# Patient Record
Sex: MALE | Race: OTHER | NOT HISPANIC OR LATINO | URBAN - METROPOLITAN AREA
[De-identification: names, ages, dates, MRNs, and addresses within clinical notes are randomized per-mention and may not be internally consistent; named-entity substitution may affect disease eponyms.]

---

## 2022-08-22 ENCOUNTER — INPATIENT (INPATIENT)
Facility: HOSPITAL | Age: 27
LOS: 5 days | Discharge: LEFT BEFORE DISCHARGE | End: 2022-08-28
Attending: HOSPITALIST | Admitting: HOSPITALIST

## 2022-08-22 VITALS
OXYGEN SATURATION: 96 % | SYSTOLIC BLOOD PRESSURE: 111 MMHG | HEART RATE: 60 BPM | RESPIRATION RATE: 18 BRPM | TEMPERATURE: 97 F | DIASTOLIC BLOOD PRESSURE: 76 MMHG

## 2022-08-22 DIAGNOSIS — E11.10 TYPE 2 DIABETES MELLITUS WITH KETOACIDOSIS WITHOUT COMA: ICD-10-CM

## 2022-08-22 LAB
ALBUMIN SERPL ELPH-MCNC: 5.1 G/DL — SIGNIFICANT CHANGE UP (ref 3.5–5.2)
ALBUMIN SERPL ELPH-MCNC: 5.4 G/DL — HIGH (ref 3.5–5.2)
ALP SERPL-CCNC: 64 U/L — SIGNIFICANT CHANGE UP (ref 30–115)
ALP SERPL-CCNC: 83 U/L — SIGNIFICANT CHANGE UP (ref 30–115)
ALT FLD-CCNC: 18 U/L — SIGNIFICANT CHANGE UP (ref 0–41)
ALT FLD-CCNC: 21 U/L — SIGNIFICANT CHANGE UP (ref 0–41)
ANION GAP SERPL CALC-SCNC: 13 MMOL/L — SIGNIFICANT CHANGE UP (ref 7–14)
ANION GAP SERPL CALC-SCNC: 16 MMOL/L — HIGH (ref 7–14)
ANION GAP SERPL CALC-SCNC: 20 MMOL/L — HIGH (ref 7–14)
AST SERPL-CCNC: 17 U/L — SIGNIFICANT CHANGE UP (ref 0–41)
AST SERPL-CCNC: 21 U/L — SIGNIFICANT CHANGE UP (ref 0–41)
B-OH-BUTYR SERPL-SCNC: 3 MMOL/L — HIGH
BASE EXCESS BLDV CALC-SCNC: -9.8 MMOL/L — LOW (ref -2–3)
BASOPHILS # BLD AUTO: 0.04 K/UL — SIGNIFICANT CHANGE UP (ref 0–0.2)
BASOPHILS NFR BLD AUTO: 0.3 % — SIGNIFICANT CHANGE UP (ref 0–1)
BILIRUB SERPL-MCNC: 0.8 MG/DL — SIGNIFICANT CHANGE UP (ref 0.2–1.2)
BILIRUB SERPL-MCNC: 1.6 MG/DL — HIGH (ref 0.2–1.2)
BUN SERPL-MCNC: 18 MG/DL — SIGNIFICANT CHANGE UP (ref 10–20)
BUN SERPL-MCNC: 19 MG/DL — SIGNIFICANT CHANGE UP (ref 10–20)
BUN SERPL-MCNC: 20 MG/DL — SIGNIFICANT CHANGE UP (ref 10–20)
CA-I SERPL-SCNC: 1.21 MMOL/L — SIGNIFICANT CHANGE UP (ref 1.15–1.33)
CALCIUM SERPL-MCNC: 10.5 MG/DL — HIGH (ref 8.5–10.1)
CALCIUM SERPL-MCNC: 9.7 MG/DL — SIGNIFICANT CHANGE UP (ref 8.5–10.1)
CALCIUM SERPL-MCNC: 9.8 MG/DL — SIGNIFICANT CHANGE UP (ref 8.5–10.1)
CHLORIDE SERPL-SCNC: 104 MMOL/L — SIGNIFICANT CHANGE UP (ref 98–110)
CHLORIDE SERPL-SCNC: 107 MMOL/L — SIGNIFICANT CHANGE UP (ref 98–110)
CHLORIDE SERPL-SCNC: 94 MMOL/L — LOW (ref 98–110)
CK MB CFR SERPL CALC: 1.9 NG/ML — SIGNIFICANT CHANGE UP (ref 0.6–6.3)
CK SERPL-CCNC: 83 U/L — SIGNIFICANT CHANGE UP (ref 0–225)
CO2 SERPL-SCNC: 18 MMOL/L — SIGNIFICANT CHANGE UP (ref 17–32)
CO2 SERPL-SCNC: 21 MMOL/L — SIGNIFICANT CHANGE UP (ref 17–32)
CO2 SERPL-SCNC: 21 MMOL/L — SIGNIFICANT CHANGE UP (ref 17–32)
CREAT SERPL-MCNC: 1 MG/DL — SIGNIFICANT CHANGE UP (ref 0.7–1.5)
CREAT SERPL-MCNC: 1.1 MG/DL — SIGNIFICANT CHANGE UP (ref 0.7–1.5)
CREAT SERPL-MCNC: 1.2 MG/DL — SIGNIFICANT CHANGE UP (ref 0.7–1.5)
EGFR: 106 ML/MIN/1.73M2 — SIGNIFICANT CHANGE UP
EGFR: 85 ML/MIN/1.73M2 — SIGNIFICANT CHANGE UP
EGFR: 94 ML/MIN/1.73M2 — SIGNIFICANT CHANGE UP
EOSINOPHIL # BLD AUTO: 0.06 K/UL — SIGNIFICANT CHANGE UP (ref 0–0.7)
EOSINOPHIL NFR BLD AUTO: 0.4 % — SIGNIFICANT CHANGE UP (ref 0–8)
GAS PNL BLDV: 129 MMOL/L — LOW (ref 136–145)
GAS PNL BLDV: SIGNIFICANT CHANGE UP
GLUCOSE BLDC GLUCOMTR-MCNC: 106 MG/DL — HIGH (ref 70–99)
GLUCOSE BLDC GLUCOMTR-MCNC: 149 MG/DL — HIGH (ref 70–99)
GLUCOSE BLDC GLUCOMTR-MCNC: 160 MG/DL — HIGH (ref 70–99)
GLUCOSE BLDC GLUCOMTR-MCNC: 164 MG/DL — HIGH (ref 70–99)
GLUCOSE BLDC GLUCOMTR-MCNC: 179 MG/DL — HIGH (ref 70–99)
GLUCOSE BLDC GLUCOMTR-MCNC: 215 MG/DL — HIGH (ref 70–99)
GLUCOSE BLDC GLUCOMTR-MCNC: 253 MG/DL — HIGH (ref 70–99)
GLUCOSE BLDC GLUCOMTR-MCNC: 290 MG/DL — HIGH (ref 70–99)
GLUCOSE BLDC GLUCOMTR-MCNC: 294 MG/DL — HIGH (ref 70–99)
GLUCOSE BLDC GLUCOMTR-MCNC: 422 MG/DL — HIGH (ref 70–99)
GLUCOSE SERPL-MCNC: 108 MG/DL — HIGH (ref 70–99)
GLUCOSE SERPL-MCNC: 125 MG/DL — HIGH (ref 70–99)
GLUCOSE SERPL-MCNC: 501 MG/DL — CRITICAL HIGH (ref 70–99)
HCO3 BLDV-SCNC: 20 MMOL/L — LOW (ref 22–29)
HCT VFR BLD CALC: 49.8 % — SIGNIFICANT CHANGE UP (ref 42–52)
HCT VFR BLDA CALC: 52 % — HIGH (ref 39–51)
HGB BLD CALC-MCNC: 17.2 G/DL — SIGNIFICANT CHANGE UP (ref 12.6–17.4)
HGB BLD-MCNC: 17 G/DL — SIGNIFICANT CHANGE UP (ref 14–18)
IMM GRANULOCYTES NFR BLD AUTO: 0.3 % — SIGNIFICANT CHANGE UP (ref 0.1–0.3)
LACTATE BLDV-MCNC: 4.9 MMOL/L — CRITICAL HIGH (ref 0.5–2)
LIDOCAIN IGE QN: 13 U/L — SIGNIFICANT CHANGE UP (ref 7–60)
LYMPHOCYTES # BLD AUTO: 1.27 K/UL — SIGNIFICANT CHANGE UP (ref 1.2–3.4)
LYMPHOCYTES # BLD AUTO: 8.3 % — LOW (ref 20.5–51.1)
MAGNESIUM SERPL-MCNC: 1.8 MG/DL — SIGNIFICANT CHANGE UP (ref 1.8–2.4)
MCHC RBC-ENTMCNC: 28.9 PG — SIGNIFICANT CHANGE UP (ref 27–31)
MCHC RBC-ENTMCNC: 34.1 G/DL — SIGNIFICANT CHANGE UP (ref 32–37)
MCV RBC AUTO: 84.7 FL — SIGNIFICANT CHANGE UP (ref 80–94)
MONOCYTES # BLD AUTO: 1.01 K/UL — HIGH (ref 0.1–0.6)
MONOCYTES NFR BLD AUTO: 6.6 % — SIGNIFICANT CHANGE UP (ref 1.7–9.3)
NEUTROPHILS # BLD AUTO: 12.81 K/UL — HIGH (ref 1.4–6.5)
NEUTROPHILS NFR BLD AUTO: 84.1 % — HIGH (ref 42.2–75.2)
NRBC # BLD: 0 /100 WBCS — SIGNIFICANT CHANGE UP (ref 0–0)
PCO2 BLDV: 57 MMHG — HIGH (ref 42–55)
PH BLDV: 7.15 — LOW (ref 7.32–7.43)
PLATELET # BLD AUTO: 246 K/UL — SIGNIFICANT CHANGE UP (ref 130–400)
PO2 BLDV: 30 MMHG — SIGNIFICANT CHANGE UP
POTASSIUM BLDV-SCNC: 4.7 MMOL/L — SIGNIFICANT CHANGE UP (ref 3.5–5.1)
POTASSIUM SERPL-MCNC: 3.9 MMOL/L — SIGNIFICANT CHANGE UP (ref 3.5–5)
POTASSIUM SERPL-MCNC: 4.1 MMOL/L — SIGNIFICANT CHANGE UP (ref 3.5–5)
POTASSIUM SERPL-MCNC: 5.3 MMOL/L — HIGH (ref 3.5–5)
POTASSIUM SERPL-SCNC: 3.9 MMOL/L — SIGNIFICANT CHANGE UP (ref 3.5–5)
POTASSIUM SERPL-SCNC: 4.1 MMOL/L — SIGNIFICANT CHANGE UP (ref 3.5–5)
POTASSIUM SERPL-SCNC: 5.3 MMOL/L — HIGH (ref 3.5–5)
PROT SERPL-MCNC: 8.1 G/DL — HIGH (ref 6–8)
PROT SERPL-MCNC: 8.5 G/DL — HIGH (ref 6–8)
RBC # BLD: 5.88 M/UL — SIGNIFICANT CHANGE UP (ref 4.7–6.1)
RBC # FLD: 11.9 % — SIGNIFICANT CHANGE UP (ref 11.5–14.5)
SAO2 % BLDV: 36.7 % — SIGNIFICANT CHANGE UP
SARS-COV-2 RNA SPEC QL NAA+PROBE: SIGNIFICANT CHANGE UP
SODIUM SERPL-SCNC: 132 MMOL/L — LOW (ref 135–146)
SODIUM SERPL-SCNC: 141 MMOL/L — SIGNIFICANT CHANGE UP (ref 135–146)
SODIUM SERPL-SCNC: 141 MMOL/L — SIGNIFICANT CHANGE UP (ref 135–146)
TROPONIN T SERPL-MCNC: <0.01 NG/ML — SIGNIFICANT CHANGE UP
TROPONIN T SERPL-MCNC: <0.01 NG/ML — SIGNIFICANT CHANGE UP
WBC # BLD: 15.24 K/UL — HIGH (ref 4.8–10.8)
WBC # FLD AUTO: 15.24 K/UL — HIGH (ref 4.8–10.8)

## 2022-08-22 PROCEDURE — 71045 X-RAY EXAM CHEST 1 VIEW: CPT | Mod: 26

## 2022-08-22 PROCEDURE — 93010 ELECTROCARDIOGRAM REPORT: CPT

## 2022-08-22 PROCEDURE — 99291 CRITICAL CARE FIRST HOUR: CPT

## 2022-08-22 PROCEDURE — 93010 ELECTROCARDIOGRAM REPORT: CPT | Mod: 77

## 2022-08-22 PROCEDURE — 76705 ECHO EXAM OF ABDOMEN: CPT | Mod: 26

## 2022-08-22 PROCEDURE — 99223 1ST HOSP IP/OBS HIGH 75: CPT

## 2022-08-22 RX ORDER — SODIUM CHLORIDE 9 MG/ML
1000 INJECTION, SOLUTION INTRAVENOUS
Refills: 0 | Status: DISCONTINUED | OUTPATIENT
Start: 2022-08-22 | End: 2022-08-22

## 2022-08-22 RX ORDER — DEXTROSE 50 % IN WATER 50 %
15 SYRINGE (ML) INTRAVENOUS ONCE
Refills: 0 | Status: DISCONTINUED | OUTPATIENT
Start: 2022-08-22 | End: 2022-08-28

## 2022-08-22 RX ORDER — INSULIN HUMAN 100 [IU]/ML
7 INJECTION, SOLUTION SUBCUTANEOUS
Qty: 100 | Refills: 0 | Status: DISCONTINUED | OUTPATIENT
Start: 2022-08-22 | End: 2022-08-22

## 2022-08-22 RX ORDER — ONDANSETRON 8 MG/1
4 TABLET, FILM COATED ORAL ONCE
Refills: 0 | Status: COMPLETED | OUTPATIENT
Start: 2022-08-22 | End: 2022-08-22

## 2022-08-22 RX ORDER — ENOXAPARIN SODIUM 100 MG/ML
40 INJECTION SUBCUTANEOUS EVERY 24 HOURS
Refills: 0 | Status: DISCONTINUED | OUTPATIENT
Start: 2022-08-22 | End: 2022-08-28

## 2022-08-22 RX ORDER — SODIUM CHLORIDE 9 MG/ML
2000 INJECTION INTRAMUSCULAR; INTRAVENOUS; SUBCUTANEOUS ONCE
Refills: 0 | Status: COMPLETED | OUTPATIENT
Start: 2022-08-22 | End: 2022-08-22

## 2022-08-22 RX ORDER — METOCLOPRAMIDE HCL 10 MG
10 TABLET ORAL
Refills: 0 | Status: DISCONTINUED | OUTPATIENT
Start: 2022-08-22 | End: 2022-08-24

## 2022-08-22 RX ORDER — DEXTROSE 50 % IN WATER 50 %
25 SYRINGE (ML) INTRAVENOUS ONCE
Refills: 0 | Status: DISCONTINUED | OUTPATIENT
Start: 2022-08-22 | End: 2022-08-28

## 2022-08-22 RX ORDER — PANTOPRAZOLE SODIUM 20 MG/1
40 TABLET, DELAYED RELEASE ORAL DAILY
Refills: 0 | Status: DISCONTINUED | OUTPATIENT
Start: 2022-08-22 | End: 2022-08-23

## 2022-08-22 RX ORDER — SODIUM CHLORIDE 9 MG/ML
1000 INJECTION, SOLUTION INTRAVENOUS
Refills: 0 | Status: DISCONTINUED | OUTPATIENT
Start: 2022-08-22 | End: 2022-08-23

## 2022-08-22 RX ORDER — INSULIN HUMAN 100 [IU]/ML
6 INJECTION, SOLUTION SUBCUTANEOUS
Qty: 100 | Refills: 0 | Status: DISCONTINUED | OUTPATIENT
Start: 2022-08-22 | End: 2022-08-23

## 2022-08-22 RX ORDER — INSULIN GLARGINE 100 [IU]/ML
30 INJECTION, SOLUTION SUBCUTANEOUS ONCE
Refills: 0 | Status: COMPLETED | OUTPATIENT
Start: 2022-08-22 | End: 2022-08-22

## 2022-08-22 RX ORDER — INSULIN HUMAN 100 [IU]/ML
8 INJECTION, SOLUTION SUBCUTANEOUS
Qty: 100 | Refills: 0 | Status: DISCONTINUED | OUTPATIENT
Start: 2022-08-22 | End: 2022-08-22

## 2022-08-22 RX ORDER — INSULIN GLARGINE 100 [IU]/ML
30 INJECTION, SOLUTION SUBCUTANEOUS AT BEDTIME
Refills: 0 | Status: DISCONTINUED | OUTPATIENT
Start: 2022-08-23 | End: 2022-08-23

## 2022-08-22 RX ORDER — DEXTROSE 50 % IN WATER 50 %
50 SYRINGE (ML) INTRAVENOUS ONCE
Refills: 0 | Status: COMPLETED | OUTPATIENT
Start: 2022-08-22 | End: 2022-08-22

## 2022-08-22 RX ADMIN — ENOXAPARIN SODIUM 40 MILLIGRAM(S): 100 INJECTION SUBCUTANEOUS at 18:56

## 2022-08-22 RX ADMIN — SODIUM CHLORIDE 150 MILLILITER(S): 9 INJECTION, SOLUTION INTRAVENOUS at 19:53

## 2022-08-22 RX ADMIN — INSULIN HUMAN 7 UNIT(S)/HR: 100 INJECTION, SOLUTION SUBCUTANEOUS at 12:58

## 2022-08-22 RX ADMIN — SODIUM CHLORIDE 150 MILLILITER(S): 9 INJECTION, SOLUTION INTRAVENOUS at 18:34

## 2022-08-22 RX ADMIN — SODIUM CHLORIDE 150 MILLILITER(S): 9 INJECTION, SOLUTION INTRAVENOUS at 23:08

## 2022-08-22 RX ADMIN — Medication 50 MILLILITER(S): at 22:20

## 2022-08-22 RX ADMIN — ONDANSETRON 4 MILLIGRAM(S): 8 TABLET, FILM COATED ORAL at 11:14

## 2022-08-22 RX ADMIN — Medication 10 MILLIGRAM(S): at 18:10

## 2022-08-22 RX ADMIN — INSULIN GLARGINE 30 UNIT(S): 100 INJECTION, SOLUTION SUBCUTANEOUS at 22:28

## 2022-08-22 RX ADMIN — SODIUM CHLORIDE 2000 MILLILITER(S): 9 INJECTION INTRAMUSCULAR; INTRAVENOUS; SUBCUTANEOUS at 11:14

## 2022-08-22 RX ADMIN — ONDANSETRON 4 MILLIGRAM(S): 8 TABLET, FILM COATED ORAL at 12:58

## 2022-08-22 RX ADMIN — Medication 10 MILLIGRAM(S): at 15:30

## 2022-08-22 NOTE — H&P ADULT - PROBLEM SELECTOR PLAN 1
admit icu  correct sodium 138  1/2 NS @ 200 cc/hr  K 5.3 - no supplement at this time - cont to monitor bmp  insulin drip started at 7 units/hr with min dec - will increase to 8units/hr and monitor  fs q1  monitor bmp  when fs <200 change to d51/2ns  endocrine eval   lipase negative  bili elevated - repeat and check RUQ sono  leukocytosis - check UA, bcx  EKG reviewed - KATIA cw repolarization - check repeat and monitor CE - first set negative  - denies chest pain, tele monitor

## 2022-08-22 NOTE — ED PROVIDER NOTE - ATTENDING APP SHARED VISIT CONTRIBUTION OF CARE
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  He is visiting [place] from [place].  He reports noncompliance to his insulin regiment over the last 2 days.  He complains of nausea and vomiting.  He denies fever.  Vital signs noted.  Appears ill.  Chest clear.  Heart regular rate no murmur.  Abdomen mild epigastric tenderness.  Diagnostic testing reviewed.  Patient is in DKA.  IV fluid and IV insulin ordered.  Patient will be admitted to the ICU.

## 2022-08-22 NOTE — ED PROVIDER NOTE - NS ED ROS FT
Constitutional: No fever, chills.  Eyes:  No visual changes  ENMT:  No neck pain  Cardiac:  No chest pain  Respiratory:  No cough, SOB  GI:  (+) nausea, vomiting. denies diarrhea, abdominal pain.  :  No dysuria, hematuria  MS:  No back pain.  Neuro:  No headache or lightheadedness  Skin:  No skin rash    Except as documented in the HPI,  all other systems are negative.

## 2022-08-22 NOTE — ED PROVIDER NOTE - OBJECTIVE STATEMENT
27 yo M pmhx IDDM presenting to the ED for evaluation of nausea, vomiting, epigastric discomfort since last night. Pt reports he is visiting from [place], admits to missing doses of insulin over the past few days. Reports intractable nausea and NBNB vomiting. Denies any modifying factors. Denies fever, chills, chest pain, sob, diarrhea. Denies hx of smoking or alcohol use.

## 2022-08-22 NOTE — ED PROVIDER NOTE - CLINICAL SUMMARY MEDICAL DECISION MAKING FREE TEXT BOX
Patient is in DKA with unstable vital signs.  Patient is stabilized somewhat with the ED care.  He is critically ill.  He will be admitted to the ICU.

## 2022-08-22 NOTE — H&P ADULT - HISTORY OF PRESENT ILLNESS
25 yo male IDDM visiting from the [facility] - poor historian at moment due to nausea and vomiting - did not feel like speaking but tells me that vomiting and nasusea for 2 days - found to be in DKA in ER and admitted to ICU. states compliance with insulin, no recent illness or undercooked food exposure , + chills - no fever, denies etoh, drugs, no diarrhea, cough, dysuria normally takes basal insulin 30 units and 13 units split between prandial meals - doesnt recall names of insulin at moment.

## 2022-08-22 NOTE — ED PROVIDER NOTE - CRITICAL CARE ATTENDING CONTRIBUTION TO CARE
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  He is visiting [place] from [place].  He reports noncompliance to his insulin regiment over the last 2 days.  He complains of nausea and vomiting.  He denies fever.  Vital signs noted.  Appears ill.  Chest clear.  Heart regular rate no murmur.  Abdomen mild epigastric tenderness.  Diagnostic testing reviewed.  Patient is in DKA.  IV fluid and IV insulin ordered.  Patient will be admitted to the ICU.    I have reviewed and agree with the PA notes.

## 2022-08-22 NOTE — PATIENT PROFILE ADULT - FALL HARM RISK - HARM RISK INTERVENTIONS

## 2022-08-22 NOTE — ED PROVIDER NOTE - NS ED ATTENDING STATEMENT MOD
This was a shared visit with the SONG. I reviewed and verified the documentation and independently performed the documented: I have personally provided the amount of critical care time documented below excluding time spent on separate procedures.

## 2022-08-23 LAB
A1C WITH ESTIMATED AVERAGE GLUCOSE RESULT: 11.3 % — HIGH (ref 4–5.6)
ALBUMIN SERPL ELPH-MCNC: 4.7 G/DL — SIGNIFICANT CHANGE UP (ref 3.5–5.2)
ALP SERPL-CCNC: 65 U/L — SIGNIFICANT CHANGE UP (ref 30–115)
ALT FLD-CCNC: 14 U/L — SIGNIFICANT CHANGE UP (ref 0–41)
ANION GAP SERPL CALC-SCNC: 11 MMOL/L — SIGNIFICANT CHANGE UP (ref 7–14)
ANION GAP SERPL CALC-SCNC: 13 MMOL/L — SIGNIFICANT CHANGE UP (ref 7–14)
ANION GAP SERPL CALC-SCNC: 9 MMOL/L — SIGNIFICANT CHANGE UP (ref 7–14)
AST SERPL-CCNC: 13 U/L — SIGNIFICANT CHANGE UP (ref 0–41)
BILIRUB SERPL-MCNC: 0.7 MG/DL — SIGNIFICANT CHANGE UP (ref 0.2–1.2)
BUN SERPL-MCNC: 14 MG/DL — SIGNIFICANT CHANGE UP (ref 10–20)
BUN SERPL-MCNC: 15 MG/DL — SIGNIFICANT CHANGE UP (ref 10–20)
BUN SERPL-MCNC: 15 MG/DL — SIGNIFICANT CHANGE UP (ref 10–20)
CALCIUM SERPL-MCNC: 9.6 MG/DL — SIGNIFICANT CHANGE UP (ref 8.5–10.1)
CALCIUM SERPL-MCNC: 9.7 MG/DL — SIGNIFICANT CHANGE UP (ref 8.5–10.1)
CALCIUM SERPL-MCNC: 9.8 MG/DL — SIGNIFICANT CHANGE UP (ref 8.5–10.1)
CHLORIDE SERPL-SCNC: 101 MMOL/L — SIGNIFICANT CHANGE UP (ref 98–110)
CHLORIDE SERPL-SCNC: 103 MMOL/L — SIGNIFICANT CHANGE UP (ref 98–110)
CHLORIDE SERPL-SCNC: 103 MMOL/L — SIGNIFICANT CHANGE UP (ref 98–110)
CO2 SERPL-SCNC: 22 MMOL/L — SIGNIFICANT CHANGE UP (ref 17–32)
CO2 SERPL-SCNC: 23 MMOL/L — SIGNIFICANT CHANGE UP (ref 17–32)
CO2 SERPL-SCNC: 25 MMOL/L — SIGNIFICANT CHANGE UP (ref 17–32)
CREAT SERPL-MCNC: 0.9 MG/DL — SIGNIFICANT CHANGE UP (ref 0.7–1.5)
CREAT SERPL-MCNC: 1 MG/DL — SIGNIFICANT CHANGE UP (ref 0.7–1.5)
CREAT SERPL-MCNC: 1 MG/DL — SIGNIFICANT CHANGE UP (ref 0.7–1.5)
EGFR: 106 ML/MIN/1.73M2 — SIGNIFICANT CHANGE UP
EGFR: 106 ML/MIN/1.73M2 — SIGNIFICANT CHANGE UP
EGFR: 120 ML/MIN/1.73M2 — SIGNIFICANT CHANGE UP
ESTIMATED AVERAGE GLUCOSE: 278 MG/DL — HIGH (ref 68–114)
GLUCOSE BLDC GLUCOMTR-MCNC: 143 MG/DL — HIGH (ref 70–99)
GLUCOSE BLDC GLUCOMTR-MCNC: 154 MG/DL — HIGH (ref 70–99)
GLUCOSE BLDC GLUCOMTR-MCNC: 189 MG/DL — HIGH (ref 70–99)
GLUCOSE BLDC GLUCOMTR-MCNC: 234 MG/DL — HIGH (ref 70–99)
GLUCOSE BLDC GLUCOMTR-MCNC: 248 MG/DL — HIGH (ref 70–99)
GLUCOSE BLDC GLUCOMTR-MCNC: 266 MG/DL — HIGH (ref 70–99)
GLUCOSE BLDC GLUCOMTR-MCNC: 267 MG/DL — HIGH (ref 70–99)
GLUCOSE BLDC GLUCOMTR-MCNC: 302 MG/DL — HIGH (ref 70–99)
GLUCOSE BLDC GLUCOMTR-MCNC: 306 MG/DL — HIGH (ref 70–99)
GLUCOSE BLDC GLUCOMTR-MCNC: 94 MG/DL — SIGNIFICANT CHANGE UP (ref 70–99)
GLUCOSE BLDC GLUCOMTR-MCNC: 97 MG/DL — SIGNIFICANT CHANGE UP (ref 70–99)
GLUCOSE SERPL-MCNC: 169 MG/DL — HIGH (ref 70–99)
GLUCOSE SERPL-MCNC: 204 MG/DL — HIGH (ref 70–99)
GLUCOSE SERPL-MCNC: 68 MG/DL — LOW (ref 70–99)
HCT VFR BLD CALC: 46.5 % — SIGNIFICANT CHANGE UP (ref 42–52)
HGB BLD-MCNC: 15.8 G/DL — SIGNIFICANT CHANGE UP (ref 14–18)
MAGNESIUM SERPL-MCNC: 1.9 MG/DL — SIGNIFICANT CHANGE UP (ref 1.8–2.4)
MCHC RBC-ENTMCNC: 28.1 PG — SIGNIFICANT CHANGE UP (ref 27–31)
MCHC RBC-ENTMCNC: 34 G/DL — SIGNIFICANT CHANGE UP (ref 32–37)
MCV RBC AUTO: 82.7 FL — SIGNIFICANT CHANGE UP (ref 80–94)
NRBC # BLD: 0 /100 WBCS — SIGNIFICANT CHANGE UP (ref 0–0)
PHOSPHATE SERPL-MCNC: 2.7 MG/DL — SIGNIFICANT CHANGE UP (ref 2.1–4.9)
PLATELET # BLD AUTO: 240 K/UL — SIGNIFICANT CHANGE UP (ref 130–400)
POTASSIUM SERPL-MCNC: 3.6 MMOL/L — SIGNIFICANT CHANGE UP (ref 3.5–5)
POTASSIUM SERPL-MCNC: 4 MMOL/L — SIGNIFICANT CHANGE UP (ref 3.5–5)
POTASSIUM SERPL-MCNC: 4.3 MMOL/L — SIGNIFICANT CHANGE UP (ref 3.5–5)
POTASSIUM SERPL-SCNC: 3.6 MMOL/L — SIGNIFICANT CHANGE UP (ref 3.5–5)
POTASSIUM SERPL-SCNC: 4 MMOL/L — SIGNIFICANT CHANGE UP (ref 3.5–5)
POTASSIUM SERPL-SCNC: 4.3 MMOL/L — SIGNIFICANT CHANGE UP (ref 3.5–5)
PROT SERPL-MCNC: 7.5 G/DL — SIGNIFICANT CHANGE UP (ref 6–8)
RBC # BLD: 5.62 M/UL — SIGNIFICANT CHANGE UP (ref 4.7–6.1)
RBC # FLD: 12 % — SIGNIFICANT CHANGE UP (ref 11.5–14.5)
SODIUM SERPL-SCNC: 135 MMOL/L — SIGNIFICANT CHANGE UP (ref 135–146)
SODIUM SERPL-SCNC: 137 MMOL/L — SIGNIFICANT CHANGE UP (ref 135–146)
SODIUM SERPL-SCNC: 138 MMOL/L — SIGNIFICANT CHANGE UP (ref 135–146)
WBC # BLD: 18.07 K/UL — HIGH (ref 4.8–10.8)
WBC # FLD AUTO: 18.07 K/UL — HIGH (ref 4.8–10.8)

## 2022-08-23 PROCEDURE — 99233 SBSQ HOSP IP/OBS HIGH 50: CPT

## 2022-08-23 PROCEDURE — 93010 ELECTROCARDIOGRAM REPORT: CPT

## 2022-08-23 PROCEDURE — 99223 1ST HOSP IP/OBS HIGH 75: CPT

## 2022-08-23 PROCEDURE — 99253 IP/OBS CNSLTJ NEW/EST LOW 45: CPT

## 2022-08-23 RX ORDER — GLUCAGON INJECTION, SOLUTION 0.5 MG/.1ML
1 INJECTION, SOLUTION SUBCUTANEOUS ONCE
Refills: 0 | Status: DISCONTINUED | OUTPATIENT
Start: 2022-08-23 | End: 2022-08-28

## 2022-08-23 RX ORDER — ONDANSETRON 8 MG/1
4 TABLET, FILM COATED ORAL ONCE
Refills: 0 | Status: COMPLETED | OUTPATIENT
Start: 2022-08-23 | End: 2022-08-23

## 2022-08-23 RX ORDER — INSULIN HUMAN 100 [IU]/ML
1 INJECTION, SOLUTION SUBCUTANEOUS
Qty: 100 | Refills: 0 | Status: DISCONTINUED | OUTPATIENT
Start: 2022-08-23 | End: 2022-08-23

## 2022-08-23 RX ORDER — INSULIN LISPRO 100/ML
VIAL (ML) SUBCUTANEOUS
Refills: 0 | Status: DISCONTINUED | OUTPATIENT
Start: 2022-08-23 | End: 2022-08-28

## 2022-08-23 RX ORDER — ONDANSETRON 8 MG/1
4 TABLET, FILM COATED ORAL EVERY 6 HOURS
Refills: 0 | Status: DISCONTINUED | OUTPATIENT
Start: 2022-08-23 | End: 2022-08-25

## 2022-08-23 RX ORDER — SODIUM CHLORIDE 9 MG/ML
1000 INJECTION, SOLUTION INTRAVENOUS
Refills: 0 | Status: DISCONTINUED | OUTPATIENT
Start: 2022-08-23 | End: 2022-08-28

## 2022-08-23 RX ORDER — SODIUM CHLORIDE 9 MG/ML
1000 INJECTION, SOLUTION INTRAVENOUS
Refills: 0 | Status: DISCONTINUED | OUTPATIENT
Start: 2022-08-23 | End: 2022-08-24

## 2022-08-23 RX ORDER — INSULIN LISPRO 100/ML
10 VIAL (ML) SUBCUTANEOUS
Refills: 0 | Status: DISCONTINUED | OUTPATIENT
Start: 2022-08-23 | End: 2022-08-27

## 2022-08-23 RX ORDER — INSULIN GLARGINE 100 [IU]/ML
30 INJECTION, SOLUTION SUBCUTANEOUS AT BEDTIME
Refills: 0 | Status: DISCONTINUED | OUTPATIENT
Start: 2022-08-23 | End: 2022-08-27

## 2022-08-23 RX ORDER — FAMOTIDINE 10 MG/ML
40 INJECTION INTRAVENOUS
Refills: 0 | Status: DISCONTINUED | OUTPATIENT
Start: 2022-08-23 | End: 2022-08-28

## 2022-08-23 RX ORDER — INSULIN LISPRO 100/ML
30 VIAL (ML) SUBCUTANEOUS
Refills: 0 | Status: DISCONTINUED | OUTPATIENT
Start: 2022-08-23 | End: 2022-08-23

## 2022-08-23 RX ORDER — INSULIN HUMAN 100 [IU]/ML
3 INJECTION, SOLUTION SUBCUTANEOUS
Qty: 100 | Refills: 0 | Status: DISCONTINUED | OUTPATIENT
Start: 2022-08-23 | End: 2022-08-23

## 2022-08-23 RX ORDER — INSULIN GLARGINE 100 [IU]/ML
15 INJECTION, SOLUTION SUBCUTANEOUS ONCE
Refills: 0 | Status: COMPLETED | OUTPATIENT
Start: 2022-08-23 | End: 2022-08-23

## 2022-08-23 RX ADMIN — INSULIN GLARGINE 15 UNIT(S): 100 INJECTION, SOLUTION SUBCUTANEOUS at 21:45

## 2022-08-23 RX ADMIN — INSULIN HUMAN 1 UNIT(S)/HR: 100 INJECTION, SOLUTION SUBCUTANEOUS at 06:50

## 2022-08-23 RX ADMIN — Medication 10 UNIT(S): at 12:01

## 2022-08-23 RX ADMIN — Medication 10 MILLIGRAM(S): at 21:07

## 2022-08-23 RX ADMIN — ENOXAPARIN SODIUM 40 MILLIGRAM(S): 100 INJECTION SUBCUTANEOUS at 17:30

## 2022-08-23 RX ADMIN — Medication 10 MILLIGRAM(S): at 08:07

## 2022-08-23 RX ADMIN — INSULIN HUMAN 3 UNIT(S)/HR: 100 INJECTION, SOLUTION SUBCUTANEOUS at 00:30

## 2022-08-23 RX ADMIN — SODIUM CHLORIDE 75 MILLILITER(S): 9 INJECTION, SOLUTION INTRAVENOUS at 21:50

## 2022-08-23 RX ADMIN — ONDANSETRON 4 MILLIGRAM(S): 8 TABLET, FILM COATED ORAL at 09:29

## 2022-08-23 RX ADMIN — FAMOTIDINE 40 MILLIGRAM(S): 10 INJECTION INTRAVENOUS at 19:57

## 2022-08-23 RX ADMIN — Medication 8: at 12:01

## 2022-08-23 RX ADMIN — FAMOTIDINE 40 MILLIGRAM(S): 10 INJECTION INTRAVENOUS at 12:20

## 2022-08-23 RX ADMIN — Medication 10 UNIT(S): at 17:20

## 2022-08-23 NOTE — CHART NOTE - NSCHARTNOTEFT_GEN_A_CORE
MICU Transfer Note    Transfer from: MICU  Transfer to:  [facility] Floors      27 yo male IDDM visiting from the [facility] - poor historian at moment due to nausea and vomiting - did not feel like speaking but tells me that vomiting and nasusea for 2 days - found to be in DKA in ER and admitted to ICU. No recent illness or undercooked food exposure , + chills - no fever, denies etoh, drugs, no diarrhea, cough, dysuria normally takes basal insulin 30 units and 13 units split between prandial meals. On re-questioning, patient admitted to not taking his insulin.     s/p Insulin gtt & anion gap closure; Now switched to SQ Insulin; Patient asymptomatic and now stable for downgrade to floors;   Pending Endocrine recommendations.     Vital Signs Last 24 Hrs  T(C): 36.9 (23 Aug 2022 07:00), Max: 37.4 (22 Aug 2022 23:05)  T(F): 98.5 (23 Aug 2022 07:00), Max: 99.4 (22 Aug 2022 23:05)  HR: 68 (23 Aug 2022 09:00) (58 - 72)  BP: 131/71 (23 Aug 2022 09:00) (104/66 - 140/70)  BP(mean): 94 (23 Aug 2022 09:00) (81 - 106)  RR: 18 (23 Aug 2022 09:00) (8 - 31)  SpO2: 100% (23 Aug 2022 09:00) (97% - 100%)    Parameters below as of 23 Aug 2022 09:00  Patient On (Oxygen Delivery Method): room air      I&O's Summary    22 Aug 2022 07:01  -  23 Aug 2022 07:00  --------------------------------------------------------  IN: 2324 mL / OUT: 800 mL / NET: 1524 mL          MEDICATIONS  (STANDING):  dextrose 5%. 1000 milliLiter(s) (50 mL/Hr) IV Continuous <Continuous>  dextrose 5%. 1000 milliLiter(s) (100 mL/Hr) IV Continuous <Continuous>  dextrose 50% Injectable 25 Gram(s) IV Push once  dextrose Oral Gel 15 Gram(s) Oral once  enoxaparin Injectable 40 milliGRAM(s) SubCutaneous every 24 hours  glucagon  Injectable 1 milliGRAM(s) IntraMuscular once  insulin glargine Injectable (LANTUS) 30 Unit(s) SubCutaneous at bedtime  insulin lispro (ADMELOG) corrective regimen sliding scale   SubCutaneous three times a day before meals  insulin lispro Injectable (ADMELOG) 30 Unit(s) SubCutaneous three times a day before meals  pantoprazole  Injectable 40 milliGRAM(s) IV Push daily    MEDICATIONS  (PRN):  metoclopramide Injectable 10 milliGRAM(s) IV Push four times a day PRN vomiting nausea  ondansetron    Tablet 4 milliGRAM(s) Oral every 6 hours PRN Nausea and/or Vomiting        LABS                                            15.8                  Neurophils% (auto):   x      (08-23 @ 05:38):    18.07)-----------(240          Lymphocytes% (auto):  x                                             46.5                   Eosinphils% (auto):   x        Manual%: Neutrophils x    ; Lymphocytes x    ; Eosinophils x    ; Bands%: x    ; Blasts x                                    137    |  103    |  14                  Calcium: 9.8   / iCa: x      (08-23 @ 05:38)    ----------------------------<  169       Magnesium: 1.9                              4.3     |  23     |  0.9              Phosphorous: 2.9      TPro  7.5    /  Alb  4.7    /  TBili  0.7    /  DBili  x      /  AST  13     /  ALT  14     /  AlkPhos  65     23 Aug 2022 05:38

## 2022-08-23 NOTE — PROVIDER CONTACT NOTE (MEDICATION) - ASSESSMENT
Pt nauseous- spitting up small amounts of clear-yellow fluid   Recently medicated w/ pepcid, & relgan prn   FS 97

## 2022-08-23 NOTE — CONSULT NOTE ADULT - SUBJECTIVE AND OBJECTIVE BOX
HPI:  25 yo male IDDM visiting from the [facility] - poor historian at moment due to nausea and vomiting - did not feel like speaking but tells me that vomiting and nasusea for 2 days - found to be in DKA in ER and admitted to ICU. states compliance with insulin, no recent illness or undercooked food exposure , + chills - no fever, denies etoh, drugs, no diarrhea, cough, dysuria normally takes basal insulin 30 units and 13 units split between prandial meals - doesnt recall names of insulin at moment.  (22 Aug 2022 14:15)          PAST MEDICAL & SURGICAL HISTORY  Insulin dependent type 1 diabetes mellitus    No significant past surgical history        FAMILY HISTORY:  FAMILY HISTORY:  No pertinent family history in first degree relatives        SOCIAL HISTORY:  []smoker  []Alcohol  []Drug    ALLERGIES:  No Known Allergies      MEDICATIONS:  MEDICATIONS  (STANDING):  dextrose 5%. 1000 milliLiter(s) (50 mL/Hr) IV Continuous <Continuous>  dextrose 5%. 1000 milliLiter(s) (100 mL/Hr) IV Continuous <Continuous>  dextrose 50% Injectable 25 Gram(s) IV Push once  dextrose Oral Gel 15 Gram(s) Oral once  enoxaparin Injectable 40 milliGRAM(s) SubCutaneous every 24 hours  glucagon  Injectable 1 milliGRAM(s) IntraMuscular once  insulin glargine Injectable (LANTUS) 30 Unit(s) SubCutaneous at bedtime  insulin lispro (ADMELOG) corrective regimen sliding scale   SubCutaneous three times a day before meals  insulin lispro Injectable (ADMELOG) 10 Unit(s) SubCutaneous three times a day before meals    MEDICATIONS  (PRN):  famotidine    Tablet 40 milliGRAM(s) Oral two times a day PRN Reflux  metoclopramide Injectable 10 milliGRAM(s) IV Push four times a day PRN vomiting nausea  ondansetron    Tablet 4 milliGRAM(s) Oral every 6 hours PRN Nausea and/or Vomiting      HOME MEDICATIONS:  Home Medications:      VITALS:   T(F): 98.4 (08-23 @ 11:01), Max: 99.4 (08-22 @ 23:05)  HR: 65 (08-23 @ 13:20) (58 - 72)  BP: 127/84 (08-23 @ 13:20) (104/66 - 140/70)  BP(mean): 101 (08-23 @ 13:20) (81 - 106)  RR: 12 (08-23 @ 13:20) (8 - 31)  SpO2: 100% (08-23 @ 13:20) (96% - 100%)    I&O's Summary    22 Aug 2022 07:01  -  23 Aug 2022 07:00  --------------------------------------------------------  IN: 2324 mL / OUT: 800 mL / NET: 1524 mL        REVIEW OF SYSTEMS:  CONSTITUTIONAL: No weakness, fevers or chills  EYES: No visual changes  ENT: No vertigo or throat pain   NECK: No pain or stiffness  RESPIRATORY: No cough, wheezing, hemoptysis; No shortness of breath  CARDIOVASCULAR: No chest pain or palpitations  GASTROINTESTINAL: No abdominal or epigastric pain.+ nausea, vomiting, or hematemesis; No diarrhea or constipation.  GENITOURINARY: No Polyuria  NEUROLOGICAL:  No tremors, no Weakness or numbness      PHYSICAL EXAM:  GENERAL: Patient is awake , alert and oriented,  not in acute distress  EYES: No proptosis, no lid lag  NECK: No thyroid enlargement, no palpable nodules , no bruit  LUNGS: Clear to auscultation bilaterally   CARDIOVASCULAR: S1/S2 present, RRR , no murmurs  ABD: Soft, non-tender, non-distended, +BS  EXT: No VANESSA      LABS:                        15.8   18.07 )-----------( 240      ( 23 Aug 2022 05:38 )             46.5     08-23    137  |  103  |  14  ----------------------------<  169<H>  4.3   |  23  |  0.9    Ca    9.8      23 Aug 2022 05:38  Phos  2.9     08-23  Mg     1.9     08-23    TPro  7.5  /  Alb  4.7  /  TBili  0.7  /  DBili  x   /  AST  13  /  ALT  14  /  AlkPhos  65  08-23      Creatine Kinase, Serum: 83 U/L (08-22-22 @ 19:18)  Troponin T, Serum: <0.01 ng/mL (08-22-22 @ 19:18)    CARDIAC MARKERS ( 22 Aug 2022 19:18 )  x     / <0.01 ng/mL / 83 U/L / x     / 1.9 ng/mL  CARDIAC MARKERS ( 22 Aug 2022 12:30 )  x     / <0.01 ng/mL / x     / x     / x            POCT Blood Glucose.: 302 mg/dL (08-23-22 @ 11:50)  POCT Blood Glucose.: 143 mg/dL (08-23-22 @ 06:14)  POCT Blood Glucose.: 189 mg/dL (08-23-22 @ 05:16)  POCT Blood Glucose.: 234 mg/dL (08-23-22 @ 04:14)  POCT Blood Glucose.: 248 mg/dL (08-23-22 @ 02:20)  POCT Blood Glucose.: 266 mg/dL (08-23-22 @ 00:58)  POCT Blood Glucose.: 306 mg/dL (08-23-22 @ 00:17)  POCT Blood Glucose.: 215 mg/dL (08-22-22 @ 23:09)  POCT Blood Glucose.: 106 mg/dL (08-22-22 @ 22:06)  POCT Blood Glucose.: 149 mg/dL (08-22-22 @ 21:12)  POCT Blood Glucose.: 253 mg/dL (08-22-22 @ 20:08)  POCT Blood Glucose.: 160 mg/dL (08-22-22 @ 19:01)  POCT Blood Glucose.: 164 mg/dL (08-22-22 @ 18:05)  POCT Blood Glucose.: 179 mg/dL (08-22-22 @ 16:13)  POCT Blood Glucose.: 290 mg/dL (08-22-22 @ 15:31)  POCT Blood Glucose.: 294 mg/dL (08-22-22 @ 14:45)  POCT Blood Glucose.: 422 mg/dL (08-22-22 @ 13:46)  POCT Blood Glucose.: 463 mg/dL (08-22-22 @ 12:46)  POCT Blood Glucose.: 460 mg/dL (08-22-22 @ 11:43)  POCT Blood Glucose.: 476 mg/dL (08-22-22 @ 10:40)    A1C with Estimated Average Glucose in AM (08.23.22 @ 05:38)

## 2022-08-23 NOTE — CONSULT NOTE ADULT - SUBJECTIVE AND OBJECTIVE BOX
Patient is a 26y old  Male who presents with a chief complaint of vomiting (22 Aug 2022 14:15)      HPI:  27 yo male IDDM visiting from the [facility] - poor historian at moment due to nausea and vomiting - did not feel like speaking but tells me that vomiting and nasusea for 2 days - found to be in DKA in ER and admitted to ICU. states compliance with insulin, no recent illness or undercooked food exposure , + chills - no fever, denies etoh, drugs, no diarrhea, cough, dysuria normally takes basal insulin 30 units and 13 units split between prandial meals - doesnt recall names of insulin at moment.  (22 Aug 2022 14:15)  patient admitted to icu started on IV fluid and insulin drip     PAST MEDICAL & SURGICAL HISTORY:  Insulin dependent type 1 diabetes mellitus      No significant past surgical history        Allergies    No Known Allergies    Intolerances      Family history : no cardiovscular family history   Home Medications:    Occupation:  Alochol: Denied  Smoking: Denied  Drug Use: Denied  Marital Status:         ROS: as in HPI; All other systems reviewed are negative    ICU Vital Signs Last 24 Hrs  T(C): 36.9 (23 Aug 2022 07:00), Max: 37.4 (22 Aug 2022 23:05)  T(F): 98.5 (23 Aug 2022 07:00), Max: 99.4 (22 Aug 2022 23:05)  HR: 71 (23 Aug 2022 07:00) (58 - 72)  BP: 116/77 (23 Aug 2022 07:00) (104/66 - 140/70)  BP(mean): 92 (23 Aug 2022 07:00) (81 - 106)  ABP: --  ABP(mean): --  RR: 16 (23 Aug 2022 07:00) (8 - 31)  SpO2: 100% (23 Aug 2022 07:00) (96% - 100%)    O2 Parameters below as of 23 Aug 2022 07:00  Patient On (Oxygen Delivery Method): room air              Physical Examination:    General: No acute distress.  Alert, oriented, interactive, nonfocal    HEENT: Pupils equal, reactive to light.  Symmetric.    PULM: Clear to auscultation bilaterally, no significant sputum production    CVS: Regular rate and rhythm, no murmurs, rubs, or gallops    ABD: Soft, nondistended, nontender, normoactive bowel sounds, no masses    EXT: No edema, nontender, no clubbing     SKIN: Warm and well perfused, no rashes noted.    Neurology : no motor or sensory deficit     Musculoskeletal : move all extremity     Lymphatic system: no Palpable node               I&O's Detail    22 Aug 2022 07:01  -  23 Aug 2022 07:00  --------------------------------------------------------  IN:    dextrose 5% + sodium chloride 0.45%: 2250 mL    Insulin: 18 mL    Insulin: 1 mL    Insulin: 12 mL    Insulin: 43 mL  Total IN: 2324 mL    OUT:    Voided (mL): 800 mL  Total OUT: 800 mL    Total NET: 1524 mL            LABS:                        15.8   18.07 )-----------( 240      ( 23 Aug 2022 05:38 )             46.5     23 Aug 2022 05:38    137    |  103    |  14     ----------------------------<  169    4.3     |  23     |  0.9      Ca    9.8        23 Aug 2022 05:38  Phos  2.9       23 Aug 2022 05:38  Mg     1.9       23 Aug 2022 05:38    TPro  7.5    /  Alb  4.7    /  TBili  0.7    /  DBili  x      /  AST  13     /  ALT  14     /  AlkPhos  65     23 Aug 2022 05:38  Amylase x     lipase x          CARDIAC MARKERS ( 22 Aug 2022 19:18 )  x     / <0.01 ng/mL / 83 U/L / x     / 1.9 ng/mL  CARDIAC MARKERS ( 22 Aug 2022 12:30 )  x     / <0.01 ng/mL / x     / x     / x          CAPILLARY BLOOD GLUCOSE      POCT Blood Glucose.: 143 mg/dL (23 Aug 2022 06:14)        Culture        MEDICATIONS  (STANDING):  dextrose 5%. 1000 milliLiter(s) (50 mL/Hr) IV Continuous <Continuous>  dextrose 5%. 1000 milliLiter(s) (100 mL/Hr) IV Continuous <Continuous>  dextrose 50% Injectable 25 Gram(s) IV Push once  dextrose Oral Gel 15 Gram(s) Oral once  enoxaparin Injectable 40 milliGRAM(s) SubCutaneous every 24 hours  glucagon  Injectable 1 milliGRAM(s) IntraMuscular once  insulin glargine Injectable (LANTUS) 30 Unit(s) SubCutaneous at bedtime  insulin lispro (ADMELOG) corrective regimen sliding scale   SubCutaneous three times a day before meals  insulin lispro Injectable (ADMELOG) 30 Unit(s) SubCutaneous three times a day before meals  ondansetron Injectable 4 milliGRAM(s) IV Push once  pantoprazole  Injectable 40 milliGRAM(s) IV Push daily    MEDICATIONS  (PRN):  metoclopramide Injectable 10 milliGRAM(s) IV Push four times a day PRN vomiting nausea  ondansetron    Tablet 4 milliGRAM(s) Oral every 6 hours PRN Nausea and/or Vomiting        RADIOLOGY: ***     CXR: no infiltrate   TLC:  OG:  ET tube:        [facility] ICU:  ECHO:

## 2022-08-24 LAB
A1C WITH ESTIMATED AVERAGE GLUCOSE RESULT: 11 % — HIGH (ref 4–5.6)
ALBUMIN SERPL ELPH-MCNC: 4.3 G/DL — SIGNIFICANT CHANGE UP (ref 3.5–5.2)
ALP SERPL-CCNC: 72 U/L — SIGNIFICANT CHANGE UP (ref 30–115)
ALT FLD-CCNC: 12 U/L — SIGNIFICANT CHANGE UP (ref 0–41)
ANION GAP SERPL CALC-SCNC: 12 MMOL/L — SIGNIFICANT CHANGE UP (ref 7–14)
AST SERPL-CCNC: 13 U/L — SIGNIFICANT CHANGE UP (ref 0–41)
BASOPHILS # BLD AUTO: 0.02 K/UL — SIGNIFICANT CHANGE UP (ref 0–0.2)
BASOPHILS NFR BLD AUTO: 0.1 % — SIGNIFICANT CHANGE UP (ref 0–1)
BILIRUB SERPL-MCNC: 0.6 MG/DL — SIGNIFICANT CHANGE UP (ref 0.2–1.2)
BUN SERPL-MCNC: 14 MG/DL — SIGNIFICANT CHANGE UP (ref 10–20)
CALCIUM SERPL-MCNC: 9.2 MG/DL — SIGNIFICANT CHANGE UP (ref 8.5–10.1)
CHLORIDE SERPL-SCNC: 99 MMOL/L — SIGNIFICANT CHANGE UP (ref 98–110)
CO2 SERPL-SCNC: 23 MMOL/L — SIGNIFICANT CHANGE UP (ref 17–32)
CREAT SERPL-MCNC: 0.9 MG/DL — SIGNIFICANT CHANGE UP (ref 0.7–1.5)
EGFR: 120 ML/MIN/1.73M2 — SIGNIFICANT CHANGE UP
EOSINOPHIL # BLD AUTO: 0.01 K/UL — SIGNIFICANT CHANGE UP (ref 0–0.7)
EOSINOPHIL NFR BLD AUTO: 0.1 % — SIGNIFICANT CHANGE UP (ref 0–8)
ESTIMATED AVERAGE GLUCOSE: 269 MG/DL — HIGH (ref 68–114)
GLUCOSE BLDC GLUCOMTR-MCNC: 182 MG/DL — HIGH (ref 70–99)
GLUCOSE BLDC GLUCOMTR-MCNC: 192 MG/DL — HIGH (ref 70–99)
GLUCOSE BLDC GLUCOMTR-MCNC: 192 MG/DL — HIGH (ref 70–99)
GLUCOSE BLDC GLUCOMTR-MCNC: 209 MG/DL — HIGH (ref 70–99)
GLUCOSE BLDC GLUCOMTR-MCNC: 217 MG/DL — HIGH (ref 70–99)
GLUCOSE BLDC GLUCOMTR-MCNC: 224 MG/DL — HIGH (ref 70–99)
GLUCOSE SERPL-MCNC: 217 MG/DL — HIGH (ref 70–99)
HCT VFR BLD CALC: 43.4 % — SIGNIFICANT CHANGE UP (ref 42–52)
HGB BLD-MCNC: 14.8 G/DL — SIGNIFICANT CHANGE UP (ref 14–18)
IMM GRANULOCYTES NFR BLD AUTO: 0.8 % — HIGH (ref 0.1–0.3)
LYMPHOCYTES # BLD AUTO: 1.38 K/UL — SIGNIFICANT CHANGE UP (ref 1.2–3.4)
LYMPHOCYTES # BLD AUTO: 8.5 % — LOW (ref 20.5–51.1)
MAGNESIUM SERPL-MCNC: 1.7 MG/DL — LOW (ref 1.8–2.4)
MCHC RBC-ENTMCNC: 28.1 PG — SIGNIFICANT CHANGE UP (ref 27–31)
MCHC RBC-ENTMCNC: 34.1 G/DL — SIGNIFICANT CHANGE UP (ref 32–37)
MCV RBC AUTO: 82.4 FL — SIGNIFICANT CHANGE UP (ref 80–94)
MONOCYTES # BLD AUTO: 1.24 K/UL — HIGH (ref 0.1–0.6)
MONOCYTES NFR BLD AUTO: 7.6 % — SIGNIFICANT CHANGE UP (ref 1.7–9.3)
NEUTROPHILS # BLD AUTO: 13.54 K/UL — HIGH (ref 1.4–6.5)
NEUTROPHILS NFR BLD AUTO: 82.9 % — HIGH (ref 42.2–75.2)
NRBC # BLD: 0 /100 WBCS — SIGNIFICANT CHANGE UP (ref 0–0)
PLATELET # BLD AUTO: 205 K/UL — SIGNIFICANT CHANGE UP (ref 130–400)
POTASSIUM SERPL-MCNC: 3.8 MMOL/L — SIGNIFICANT CHANGE UP (ref 3.5–5)
POTASSIUM SERPL-SCNC: 3.8 MMOL/L — SIGNIFICANT CHANGE UP (ref 3.5–5)
PROT SERPL-MCNC: 6.8 G/DL — SIGNIFICANT CHANGE UP (ref 6–8)
RBC # BLD: 5.27 M/UL — SIGNIFICANT CHANGE UP (ref 4.7–6.1)
RBC # FLD: 12.3 % — SIGNIFICANT CHANGE UP (ref 11.5–14.5)
SODIUM SERPL-SCNC: 134 MMOL/L — LOW (ref 135–146)
WBC # BLD: 16.32 K/UL — HIGH (ref 4.8–10.8)
WBC # FLD AUTO: 16.32 K/UL — HIGH (ref 4.8–10.8)

## 2022-08-24 PROCEDURE — 99232 SBSQ HOSP IP/OBS MODERATE 35: CPT

## 2022-08-24 PROCEDURE — 99233 SBSQ HOSP IP/OBS HIGH 50: CPT

## 2022-08-24 RX ORDER — FAMOTIDINE 10 MG/ML
20 INJECTION INTRAVENOUS ONCE
Refills: 0 | Status: COMPLETED | OUTPATIENT
Start: 2022-08-24 | End: 2022-08-24

## 2022-08-24 RX ORDER — ROBINUL 0.2 MG/ML
0.2 INJECTION INTRAMUSCULAR; INTRAVENOUS DAILY
Refills: 0 | Status: DISCONTINUED | OUTPATIENT
Start: 2022-08-24 | End: 2022-08-24

## 2022-08-24 RX ORDER — MAGNESIUM SULFATE 500 MG/ML
2 VIAL (ML) INJECTION ONCE
Refills: 0 | Status: COMPLETED | OUTPATIENT
Start: 2022-08-24 | End: 2022-08-24

## 2022-08-24 RX ORDER — ROBINUL 0.2 MG/ML
0.2 INJECTION INTRAMUSCULAR; INTRAVENOUS ONCE
Refills: 0 | Status: COMPLETED | OUTPATIENT
Start: 2022-08-24 | End: 2022-08-24

## 2022-08-24 RX ORDER — ROBINUL 0.2 MG/ML
2 INJECTION INTRAMUSCULAR; INTRAVENOUS ONCE
Refills: 0 | Status: DISCONTINUED | OUTPATIENT
Start: 2022-08-24 | End: 2022-08-24

## 2022-08-24 RX ADMIN — Medication 2: at 11:16

## 2022-08-24 RX ADMIN — Medication 2: at 17:47

## 2022-08-24 RX ADMIN — ENOXAPARIN SODIUM 40 MILLIGRAM(S): 100 INJECTION SUBCUTANEOUS at 18:25

## 2022-08-24 RX ADMIN — Medication 4: at 08:21

## 2022-08-24 RX ADMIN — Medication 30 MILLILITER(S): at 18:32

## 2022-08-24 RX ADMIN — FAMOTIDINE 20 MILLIGRAM(S): 10 INJECTION INTRAVENOUS at 19:52

## 2022-08-24 RX ADMIN — Medication 25 GRAM(S): at 08:01

## 2022-08-24 RX ADMIN — ROBINUL 0.2 MILLIGRAM(S): 0.2 INJECTION INTRAMUSCULAR; INTRAVENOUS at 19:52

## 2022-08-24 RX ADMIN — Medication 10 UNIT(S): at 08:22

## 2022-08-24 NOTE — CHART NOTE - NSCHARTNOTEFT_GEN_A_CORE
25 yo male IDDM visiting from the [facility] - poor historian at moment due to nausea and vomiting - did not feel like speaking but tells me that vomiting and nasusea for 2 days - found to be in DKA in ER and admitted to ICU. No recent illness or undercooked food exposure , + chills - no fever, denies etoh, drugs, no diarrhea, cough, dysuria normally takes basal insulin 30 units and 13 units split between prandial meals. On re-questioning, patient admitted to not taking his insulin.     s/p Insulin gtt & anion gap closure; Now switched to SQ Insulin; Patient asymptomatic and now stable for downgrade to floors;   Seen by Endocrine;  Patient still excessively salivating & unable to tolerate PO; Repeat BMP showed AG still closed;   Stable for downgrade;

## 2022-08-24 NOTE — CHART NOTE - NSCHARTNOTEFT_GEN_A_CORE
Called to bedside by RN for patient complaining of increased secretions, and burning epigastric pain that radiates up to throat. Pepcid has helped him in the past. Patient denies CP SOB back pain. PE: S1S2 no murmur CTAB.     Plan: glycopyrrolate, pepcid, and maalox Called to bedside by RN for patient complaining of increased secretions, and burning epigastric pain that radiates up to throat. Pepcid has helped him in the past. Patient denies CP SOB back pain. PE: S1S2 no murmur CTAB.     Plan: glycopyrrolate, pepcid, and maalox    Plan discussed and agreed by attending ICU doctor Dr. Gomez.

## 2022-08-25 LAB
ALBUMIN SERPL ELPH-MCNC: 4 G/DL — SIGNIFICANT CHANGE UP (ref 3.5–5.2)
ALP SERPL-CCNC: 84 U/L — SIGNIFICANT CHANGE UP (ref 30–115)
ALT FLD-CCNC: 9 U/L — SIGNIFICANT CHANGE UP (ref 0–41)
ANION GAP SERPL CALC-SCNC: 14 MMOL/L — SIGNIFICANT CHANGE UP (ref 7–14)
AST SERPL-CCNC: 13 U/L — SIGNIFICANT CHANGE UP (ref 0–41)
BASOPHILS # BLD AUTO: 0.02 K/UL — SIGNIFICANT CHANGE UP (ref 0–0.2)
BASOPHILS NFR BLD AUTO: 0.2 % — SIGNIFICANT CHANGE UP (ref 0–1)
BILIRUB SERPL-MCNC: 0.8 MG/DL — SIGNIFICANT CHANGE UP (ref 0.2–1.2)
BUN SERPL-MCNC: 13 MG/DL — SIGNIFICANT CHANGE UP (ref 10–20)
CALCIUM SERPL-MCNC: 8.8 MG/DL — SIGNIFICANT CHANGE UP (ref 8.5–10.1)
CHLORIDE SERPL-SCNC: 92 MMOL/L — LOW (ref 98–110)
CO2 SERPL-SCNC: 22 MMOL/L — SIGNIFICANT CHANGE UP (ref 17–32)
CREAT SERPL-MCNC: 0.9 MG/DL — SIGNIFICANT CHANGE UP (ref 0.7–1.5)
EGFR: 120 ML/MIN/1.73M2 — SIGNIFICANT CHANGE UP
EOSINOPHIL # BLD AUTO: 0.01 K/UL — SIGNIFICANT CHANGE UP (ref 0–0.7)
EOSINOPHIL NFR BLD AUTO: 0.1 % — SIGNIFICANT CHANGE UP (ref 0–8)
GLUCOSE BLDC GLUCOMTR-MCNC: 173 MG/DL — HIGH (ref 70–99)
GLUCOSE BLDC GLUCOMTR-MCNC: 191 MG/DL — HIGH (ref 70–99)
GLUCOSE BLDC GLUCOMTR-MCNC: 229 MG/DL — HIGH (ref 70–99)
GLUCOSE SERPL-MCNC: 291 MG/DL — HIGH (ref 70–99)
HCT VFR BLD CALC: 42.9 % — SIGNIFICANT CHANGE UP (ref 42–52)
HGB BLD-MCNC: 14.8 G/DL — SIGNIFICANT CHANGE UP (ref 14–18)
IMM GRANULOCYTES NFR BLD AUTO: 0.4 % — HIGH (ref 0.1–0.3)
LYMPHOCYTES # BLD AUTO: 1.26 K/UL — SIGNIFICANT CHANGE UP (ref 1.2–3.4)
LYMPHOCYTES # BLD AUTO: 10.7 % — LOW (ref 20.5–51.1)
MAGNESIUM SERPL-MCNC: 1.8 MG/DL — SIGNIFICANT CHANGE UP (ref 1.8–2.4)
MCHC RBC-ENTMCNC: 28.2 PG — SIGNIFICANT CHANGE UP (ref 27–31)
MCHC RBC-ENTMCNC: 34.5 G/DL — SIGNIFICANT CHANGE UP (ref 32–37)
MCV RBC AUTO: 81.7 FL — SIGNIFICANT CHANGE UP (ref 80–94)
MONOCYTES # BLD AUTO: 0.79 K/UL — HIGH (ref 0.1–0.6)
MONOCYTES NFR BLD AUTO: 6.7 % — SIGNIFICANT CHANGE UP (ref 1.7–9.3)
NEUTROPHILS # BLD AUTO: 9.63 K/UL — HIGH (ref 1.4–6.5)
NEUTROPHILS NFR BLD AUTO: 81.9 % — HIGH (ref 42.2–75.2)
NRBC # BLD: 0 /100 WBCS — SIGNIFICANT CHANGE UP (ref 0–0)
PLATELET # BLD AUTO: 162 K/UL — SIGNIFICANT CHANGE UP (ref 130–400)
POTASSIUM SERPL-MCNC: 3.9 MMOL/L — SIGNIFICANT CHANGE UP (ref 3.5–5)
POTASSIUM SERPL-SCNC: 3.9 MMOL/L — SIGNIFICANT CHANGE UP (ref 3.5–5)
PROT SERPL-MCNC: 6.5 G/DL — SIGNIFICANT CHANGE UP (ref 6–8)
RBC # BLD: 5.25 M/UL — SIGNIFICANT CHANGE UP (ref 4.7–6.1)
RBC # FLD: 11.8 % — SIGNIFICANT CHANGE UP (ref 11.5–14.5)
SODIUM SERPL-SCNC: 128 MMOL/L — LOW (ref 135–146)
WBC # BLD: 11.76 K/UL — HIGH (ref 4.8–10.8)
WBC # FLD AUTO: 11.76 K/UL — HIGH (ref 4.8–10.8)

## 2022-08-25 PROCEDURE — 99232 SBSQ HOSP IP/OBS MODERATE 35: CPT

## 2022-08-25 RX ORDER — METOCLOPRAMIDE HCL 10 MG
5 TABLET ORAL EVERY 6 HOURS
Refills: 0 | Status: DISCONTINUED | OUTPATIENT
Start: 2022-08-25 | End: 2022-08-28

## 2022-08-25 RX ORDER — METOCLOPRAMIDE HCL 10 MG
5 TABLET ORAL ONCE
Refills: 0 | Status: COMPLETED | OUTPATIENT
Start: 2022-08-25 | End: 2022-08-25

## 2022-08-25 RX ORDER — ONDANSETRON 8 MG/1
4 TABLET, FILM COATED ORAL EVERY 8 HOURS
Refills: 0 | Status: DISCONTINUED | OUTPATIENT
Start: 2022-08-25 | End: 2022-08-28

## 2022-08-25 RX ORDER — INSULIN GLARGINE 100 [IU]/ML
30 INJECTION, SOLUTION SUBCUTANEOUS
Qty: 900 | Refills: 0
Start: 2022-08-25 | End: 2022-09-23

## 2022-08-25 RX ORDER — SODIUM CHLORIDE 9 MG/ML
1000 INJECTION INTRAMUSCULAR; INTRAVENOUS; SUBCUTANEOUS
Refills: 0 | Status: DISCONTINUED | OUTPATIENT
Start: 2022-08-25 | End: 2022-08-28

## 2022-08-25 RX ORDER — INSULIN LISPRO 100/ML
10 VIAL (ML) SUBCUTANEOUS
Qty: 900 | Refills: 0
Start: 2022-08-25 | End: 2022-09-23

## 2022-08-25 RX ADMIN — ONDANSETRON 4 MILLIGRAM(S): 8 TABLET, FILM COATED ORAL at 02:02

## 2022-08-25 RX ADMIN — Medication 10 UNIT(S): at 09:06

## 2022-08-25 RX ADMIN — FAMOTIDINE 40 MILLIGRAM(S): 10 INJECTION INTRAVENOUS at 22:10

## 2022-08-25 RX ADMIN — ENOXAPARIN SODIUM 40 MILLIGRAM(S): 100 INJECTION SUBCUTANEOUS at 17:26

## 2022-08-25 RX ADMIN — SODIUM CHLORIDE 100 MILLILITER(S): 9 INJECTION INTRAMUSCULAR; INTRAVENOUS; SUBCUTANEOUS at 16:08

## 2022-08-25 RX ADMIN — Medication 5 MILLIGRAM(S): at 16:08

## 2022-08-25 RX ADMIN — Medication 10 UNIT(S): at 17:26

## 2022-08-25 RX ADMIN — Medication 6: at 09:06

## 2022-08-25 RX ADMIN — INSULIN GLARGINE 30 UNIT(S): 100 INJECTION, SOLUTION SUBCUTANEOUS at 22:06

## 2022-08-25 RX ADMIN — ONDANSETRON 4 MILLIGRAM(S): 8 TABLET, FILM COATED ORAL at 11:05

## 2022-08-25 NOTE — DISCHARGE NOTE PROVIDER - HOSPITAL COURSE
# DKA - likely due to noncompliance with insulin - did not bring insulin on vacation  DKa resolved  switched to sc insulin - fs acceptable   normally takes Humalog 13 meal, 30 basaliglar qhs  endocrine recs appreciated  tolerating oral by d/c  will send Rx to [facility]  pt travelling from [facility] - doesn't live here.   RUQ sono negative   lipase negative  resolved, tolerating diet, stable for discharge # DKA - likely due to noncompliance with insulin - did not bring insulin on vacation  DKa resolved  switched to sc insulin - fs acceptable   normally takes Humalog 13 meal, 30 basaliglar qhs  endocrine recs appreciated  tolerating oral by d/c  will send Rx to [facility]  pt travelling from [facility] - doesn't live here.   RUQ sono negative   lipase negative  blood sugars running low, insulin decreased but patient is leaving hospital ama. Risk of hypogylcemia, DKA, and death explained.

## 2022-08-25 NOTE — DISCHARGE NOTE PROVIDER - NSDCCPCAREPLAN_GEN_ALL_CORE_FT
PRINCIPAL DISCHARGE DIAGNOSIS  Diagnosis: DKA, type 1  Assessment and Plan of Treatment: You were stabilzied and restarted on Lantus and Lispro.  Rx's were sent to the pharmacy.  make sure to take your insulin regularly and FU outpatient with your PMD.  Continue to check your FS.       PRINCIPAL DISCHARGE DIAGNOSIS  Diagnosis: DKA, type 1  Assessment and Plan of Treatment: You were stabilzied and restarted on Lantus and Lispro.  Rx's were sent to the pharmacy.  make sure to take your insulin regularly and FU outpatient with your PMD.  Continue to check your FS.      SECONDARY DISCHARGE DIAGNOSES  Diagnosis: Hypoglycemia  Assessment and Plan of Treatment: You insulin lantus dose was decreased. Lispro has been stopped for now. Please follow up with your primary care doctor as soon as possible. Monitor your blood sugar before meals and at bedtime. Take glucose tablets or juice if your blood sugars are running low. If your blood sugar continues to run low despite taking a glucose supplement or any change in mental status then return to hospital immediately.

## 2022-08-25 NOTE — CHART NOTE - NSCHARTNOTEFT_GEN_A_CORE
Called by medicine attending, patient stable for discharge.  c/w Lantus 30 and lispro 10 ac w/ss.  Rx's sent to [facility] as patient w/out of state Medicaid.  Papers completed, orders placed.

## 2022-08-25 NOTE — DISCHARGE NOTE PROVIDER - NSDCMRMEDTOKEN_GEN_ALL_CORE_FT
insulin glargine 100 units/mL subcutaneous solution: 30 unit(s) subcutaneous once a day (at bedtime)  insulin lispro 100 units/mL injectable solution: 10 unit(s) injectable 3 times a day AC w/ss coverage   insulin glargine 100 units/mL subcutaneous solution: 5 unit(s) subcutaneous once a day (at bedtime)

## 2022-08-26 LAB
ALBUMIN SERPL ELPH-MCNC: 4.1 G/DL — SIGNIFICANT CHANGE UP (ref 3.5–5.2)
ALP SERPL-CCNC: 60 U/L — SIGNIFICANT CHANGE UP (ref 30–115)
ALT FLD-CCNC: 9 U/L — SIGNIFICANT CHANGE UP (ref 0–41)
ANION GAP SERPL CALC-SCNC: 12 MMOL/L — SIGNIFICANT CHANGE UP (ref 7–14)
AST SERPL-CCNC: 11 U/L — SIGNIFICANT CHANGE UP (ref 0–41)
BILIRUB SERPL-MCNC: 1 MG/DL — SIGNIFICANT CHANGE UP (ref 0.2–1.2)
BUN SERPL-MCNC: 11 MG/DL — SIGNIFICANT CHANGE UP (ref 10–20)
CALCIUM SERPL-MCNC: 9.1 MG/DL — SIGNIFICANT CHANGE UP (ref 8.5–10.1)
CHLORIDE SERPL-SCNC: 95 MMOL/L — LOW (ref 98–110)
CO2 SERPL-SCNC: 25 MMOL/L — SIGNIFICANT CHANGE UP (ref 17–32)
CREAT SERPL-MCNC: 0.8 MG/DL — SIGNIFICANT CHANGE UP (ref 0.7–1.5)
EGFR: 124 ML/MIN/1.73M2 — SIGNIFICANT CHANGE UP
GLUCOSE BLDC GLUCOMTR-MCNC: 126 MG/DL — HIGH (ref 70–99)
GLUCOSE BLDC GLUCOMTR-MCNC: 141 MG/DL — HIGH (ref 70–99)
GLUCOSE BLDC GLUCOMTR-MCNC: 142 MG/DL — HIGH (ref 70–99)
GLUCOSE BLDC GLUCOMTR-MCNC: 224 MG/DL — HIGH (ref 70–99)
GLUCOSE BLDC GLUCOMTR-MCNC: 262 MG/DL — HIGH (ref 70–99)
GLUCOSE BLDC GLUCOMTR-MCNC: 44 MG/DL — CRITICAL LOW (ref 70–99)
GLUCOSE BLDC GLUCOMTR-MCNC: 73 MG/DL — SIGNIFICANT CHANGE UP (ref 70–99)
GLUCOSE BLDC GLUCOMTR-MCNC: 76 MG/DL — SIGNIFICANT CHANGE UP (ref 70–99)
GLUCOSE SERPL-MCNC: 226 MG/DL — HIGH (ref 70–99)
HCT VFR BLD CALC: 43.8 % — SIGNIFICANT CHANGE UP (ref 42–52)
HGB BLD-MCNC: 15.2 G/DL — SIGNIFICANT CHANGE UP (ref 14–18)
MCHC RBC-ENTMCNC: 28.4 PG — SIGNIFICANT CHANGE UP (ref 27–31)
MCHC RBC-ENTMCNC: 34.7 G/DL — SIGNIFICANT CHANGE UP (ref 32–37)
MCV RBC AUTO: 81.7 FL — SIGNIFICANT CHANGE UP (ref 80–94)
NRBC # BLD: 0 /100 WBCS — SIGNIFICANT CHANGE UP (ref 0–0)
PLATELET # BLD AUTO: 212 K/UL — SIGNIFICANT CHANGE UP (ref 130–400)
POTASSIUM SERPL-MCNC: 3.8 MMOL/L — SIGNIFICANT CHANGE UP (ref 3.5–5)
POTASSIUM SERPL-SCNC: 3.8 MMOL/L — SIGNIFICANT CHANGE UP (ref 3.5–5)
PROT SERPL-MCNC: 6.6 G/DL — SIGNIFICANT CHANGE UP (ref 6–8)
RBC # BLD: 5.36 M/UL — SIGNIFICANT CHANGE UP (ref 4.7–6.1)
RBC # FLD: 11.7 % — SIGNIFICANT CHANGE UP (ref 11.5–14.5)
SODIUM SERPL-SCNC: 132 MMOL/L — LOW (ref 135–146)
WBC # BLD: 8.23 K/UL — SIGNIFICANT CHANGE UP (ref 4.8–10.8)
WBC # FLD AUTO: 8.23 K/UL — SIGNIFICANT CHANGE UP (ref 4.8–10.8)

## 2022-08-26 PROCEDURE — 99232 SBSQ HOSP IP/OBS MODERATE 35: CPT

## 2022-08-26 RX ADMIN — Medication 5 MILLIGRAM(S): at 14:18

## 2022-08-26 RX ADMIN — Medication 4: at 07:38

## 2022-08-26 RX ADMIN — ONDANSETRON 4 MILLIGRAM(S): 8 TABLET, FILM COATED ORAL at 08:18

## 2022-08-26 RX ADMIN — ENOXAPARIN SODIUM 40 MILLIGRAM(S): 100 INJECTION SUBCUTANEOUS at 17:07

## 2022-08-26 RX ADMIN — Medication 10 UNIT(S): at 07:38

## 2022-08-26 RX ADMIN — SODIUM CHLORIDE 100 MILLILITER(S): 9 INJECTION INTRAMUSCULAR; INTRAVENOUS; SUBCUTANEOUS at 08:19

## 2022-08-26 RX ADMIN — INSULIN GLARGINE 30 UNIT(S): 100 INJECTION, SOLUTION SUBCUTANEOUS at 21:15

## 2022-08-26 RX ADMIN — Medication 10 UNIT(S): at 12:21

## 2022-08-27 LAB
GLUCOSE BLDC GLUCOMTR-MCNC: 112 MG/DL — HIGH (ref 70–99)
GLUCOSE BLDC GLUCOMTR-MCNC: 185 MG/DL — HIGH (ref 70–99)
GLUCOSE BLDC GLUCOMTR-MCNC: 40 MG/DL — CRITICAL LOW (ref 70–99)
GLUCOSE BLDC GLUCOMTR-MCNC: 71 MG/DL — SIGNIFICANT CHANGE UP (ref 70–99)
GLUCOSE BLDC GLUCOMTR-MCNC: 93 MG/DL — SIGNIFICANT CHANGE UP (ref 70–99)

## 2022-08-27 PROCEDURE — 99232 SBSQ HOSP IP/OBS MODERATE 35: CPT

## 2022-08-27 RX ORDER — INSULIN GLARGINE 100 [IU]/ML
15 INJECTION, SOLUTION SUBCUTANEOUS AT BEDTIME
Refills: 0 | Status: DISCONTINUED | OUTPATIENT
Start: 2022-08-27 | End: 2022-08-28

## 2022-08-27 RX ADMIN — SODIUM CHLORIDE 100 MILLILITER(S): 9 INJECTION INTRAMUSCULAR; INTRAVENOUS; SUBCUTANEOUS at 23:38

## 2022-08-27 RX ADMIN — Medication 2: at 16:43

## 2022-08-27 NOTE — PROGRESS NOTE ADULT - SUBJECTIVE AND OBJECTIVE BOX
NADER LUNA  26y  Male      Subjective:     continues to have ongoing nausea     REVIEW OF SYSTEMS:  All 12 ROS negative except ones mentioned in HPI     T(C): 37 (08-25-22 @ 13:47), Max: 37.4 (08-25-22 @ 05:11)  HR: 59 (08-25-22 @ 13:47) (56 - 63)  BP: 108/62 (08-25-22 @ 13:47) (108/62 - 143/71)  RR: 20 (08-25-22 @ 13:47) (18 - 20)  SpO2: 99% (08-24-22 @ 19:00) (98% - 99%)    PHYSICAL EXAM:  GENERAL: NAD  HEAD:  Atraumatic, Normocephalic  ENMT: Moist mucous membranes  NECK: Supple, No JVD  CHEST/LUNG: Clear to percussion bilaterally  HEART: Regular rate and rhythm; No murmurs, rubs, or gallops  ABDOMEN: Soft, Nontender, Nondistended; Bowel sounds present  EXTREMITIES:   No clubbing, cyanosis, or edema  NERVOUS SYSTEM:  Alert & Oriented X3      Consultant(s) Notes Reviewed:  [x ] YES  [ ] NO  Care Discussed with Consultants/Other Providers [ x] YES  [ ] NO    LAB:                        14.8   11.76 )-----------( 162      ( 25 Aug 2022 07:31 )             42.9     08-25    128<L>  |  92<L>  |  13  ----------------------------<  291<H>  3.9   |  22  |  0.9    Ca    8.8      25 Aug 2022 07:31  Phos  2.7     08-23  Mg     1.8     08-25    TPro  6.5  /  Alb  4.0  /  TBili  0.8  /  DBili  x   /  AST  13  /  ALT  9   /  AlkPhos  84  08-25    LIVER FUNCTIONS - ( 25 Aug 2022 07:31 )  Alb: 4.0 g/dL / Pro: 6.5 g/dL / ALK PHOS: 84 U/L / ALT: 9 U/L / AST: 13 U/L / GGT: x                     Drug Dosing Weight  Height (cm): 185.4 (24 Aug 2022 21:07)  Weight (kg): 72.5 (22 Aug 2022 15:35)  BMI (kg/m2): 21.1 (24 Aug 2022 21:07)  BSA (m2): 1.96 (24 Aug 2022 21:07)  Height (cm): 185.4 (08-24-22 @ 21:07)  CAPILLARY BLOOD GLUCOSE  262 (25 Aug 2022 08:15)      POCT Blood Glucose.: 229 mg/dL (25 Aug 2022 11:24)  POCT Blood Glucose.: 209 mg/dL (24 Aug 2022 22:47)  POCT Blood Glucose.: 224 mg/dL (24 Aug 2022 20:56)  POCT Blood Glucose.: 192 mg/dL (24 Aug 2022 17:40)  POCT Blood Glucose.: 182 mg/dL (24 Aug 2022 15:52)    I&O's Summary    24 Aug 2022 07:01  -  25 Aug 2022 07:00  --------------------------------------------------------  IN: 0 mL / OUT: 625 mL / NET: -625 mL          RADIOLOGY & ADDITIONAL TESTS:  Imaging Personally Reviewed:  [x] YES  [ ] NO    MEDS:  dextrose 5%. 1000 milliLiter(s) IV Continuous <Continuous>  dextrose 5%. 1000 milliLiter(s) IV Continuous <Continuous>  dextrose 50% Injectable 25 Gram(s) IV Push once  dextrose Oral Gel 15 Gram(s) Oral once  enoxaparin Injectable 40 milliGRAM(s) SubCutaneous every 24 hours  famotidine    Tablet 40 milliGRAM(s) Oral two times a day PRN  glucagon  Injectable 1 milliGRAM(s) IntraMuscular once  insulin glargine Injectable (LANTUS) 30 Unit(s) SubCutaneous at bedtime  insulin lispro (ADMELOG) corrective regimen sliding scale   SubCutaneous three times a day before meals  insulin lispro Injectable (ADMELOG) 10 Unit(s) SubCutaneous three times a day before meals  metoclopramide 5 milliGRAM(s) Oral every 6 hours PRN  metoclopramide 5 milliGRAM(s) Oral once  ondansetron Injectable 4 milliGRAM(s) IV Push every 8 hours PRN      
Patient is a 26y old  Male who presents with a chief complaint of vomiting (23 Aug 2022 14:15)      Over Night Events:  Patient seen and examined.   feel good     ROS:  See HPI    PHYSICAL EXAM    ICU Vital Signs Last 24 Hrs  T(C): 36.2 (24 Aug 2022 07:00), Max: 37.2 (24 Aug 2022 03:00)  T(F): 97.2 (24 Aug 2022 07:00), Max: 98.9 (24 Aug 2022 03:00)  HR: 55 (24 Aug 2022 07:00) (50 - 104)  BP: 119/78 (24 Aug 2022 05:16) (119/78 - 149/65)  BP(mean): 93 (24 Aug 2022 05:16) (93 - 101)  ABP: --  ABP(mean): --  RR: 16 (24 Aug 2022 07:00) (9 - 27)  SpO2: 100% (24 Aug 2022 05:16) (98% - 100%)    O2 Parameters below as of 24 Aug 2022 05:16  Patient On (Oxygen Delivery Method): room air            General:AOx3  HEENT:       heron         Lymph Nodes: NO cervical LN   Lungs: Bilateral BS  Cardiovascular: Regular   Abdomen: Soft, Positive BS  Extremities: No clubbing   Skin: warm   Neurological: no focal   Musculoskeletal: move all ext     I&O's Detail    23 Aug 2022 07:01  -  24 Aug 2022 07:00  --------------------------------------------------------  IN:    dextrose 5% + sodium chloride 0.9%: 750 mL    Oral Fluid: 260 mL  Total IN: 1010 mL    OUT:    Voided (mL): 750 mL  Total OUT: 750 mL    Total NET: 260 mL          LABS:                          14.8   16.32 )-----------( 205      ( 24 Aug 2022 05:27 )             43.4         24 Aug 2022 05:27    134    |  99     |  14     ----------------------------<  217    3.8     |  23     |  0.9      Ca    9.2        24 Aug 2022 05:27  Phos  2.7       23 Aug 2022 14:49  Mg     1.7       24 Aug 2022 05:27    TPro  6.8    /  Alb  4.3    /  TBili  0.6    /  DBili  x      /  AST  13     /  ALT  12     /  AlkPhos  72     24 Aug 2022 05:27  Amylase x     lipase x                                                                                              CARDIAC MARKERS ( 22 Aug 2022 19:18 )  x     / <0.01 ng/mL / 83 U/L / x     / 1.9 ng/mL  CARDIAC MARKERS ( 22 Aug 2022 12:30 )  x     / <0.01 ng/mL / x     / x     / x                                                                                                                                                 MEDICATIONS  (STANDING):  dextrose 5%. 1000 milliLiter(s) (50 mL/Hr) IV Continuous <Continuous>  dextrose 5%. 1000 milliLiter(s) (100 mL/Hr) IV Continuous <Continuous>  dextrose 50% Injectable 25 Gram(s) IV Push once  dextrose Oral Gel 15 Gram(s) Oral once  enoxaparin Injectable 40 milliGRAM(s) SubCutaneous every 24 hours  glucagon  Injectable 1 milliGRAM(s) IntraMuscular once  insulin glargine Injectable (LANTUS) 30 Unit(s) SubCutaneous at bedtime  insulin lispro (ADMELOG) corrective regimen sliding scale   SubCutaneous three times a day before meals  insulin lispro Injectable (ADMELOG) 10 Unit(s) SubCutaneous three times a day before meals    MEDICATIONS  (PRN):  famotidine    Tablet 40 milliGRAM(s) Oral two times a day PRN Reflux  metoclopramide Injectable 10 milliGRAM(s) IV Push four times a day PRN vomiting nausea  ondansetron    Tablet 4 milliGRAM(s) Oral every 6 hours PRN Nausea and/or Vomiting          Xrays:  TLC:  OG:  ET tube:                                                                                       ECHO:  [facility] ICU:        
  NADER LUNA  26y  Male      Subjective:     nausea and vomiting improving. Has multiple episodes of hypoglycema     REVIEW OF SYSTEMS:  All 12 ROS negative except ones mentioned in HPI     Vital Signs Last 24 Hrs  T(C): 37 (27 Aug 2022 04:32), Max: 37.2 (26 Aug 2022 20:40)  T(F): 98.6 (27 Aug 2022 04:32), Max: 98.9 (26 Aug 2022 20:40)  HR: 62 (27 Aug 2022 04:32) (57 - 62)  BP: 127/60 (27 Aug 2022 04:32) (127/60 - 156/105)  BP(mean): --  RR: 18 (27 Aug 2022 04:32) (16 - 18)  SpO2: --      PHYSICAL EXAM:  GENERAL: NAD  HEAD:  Atraumatic, Normocephalic  ENMT: Moist mucous membranes  NECK: Supple, No JVD  CHEST/LUNG: Clear to percussion bilaterally  HEART: Regular rate and rhythm; No murmurs, rubs, or gallops  ABDOMEN: Soft, Nontender, Nondistended; Bowel sounds present  EXTREMITIES:   No clubbing, cyanosis, or edema  NERVOUS SYSTEM:  Alert & Oriented X3      Consultant(s) Notes Reviewed:  [x ] YES  [ ] NO  Care Discussed with Consultants/Other Providers [ x] YES  [ ] NO                            15.2   8.23  )-----------( 212      ( 26 Aug 2022 08:22 )             43.8   08-26    132<L>  |  95<L>  |  11  ----------------------------<  226<H>  3.8   |  25  |  0.8    Ca    9.1      26 Aug 2022 08:22  Mg     1.8     08-25    TPro  6.6  /  Alb  4.1  /  TBili  1.0  /  DBili  x   /  AST  11  /  ALT  9   /  AlkPhos  60  08-26              Drug Dosing Weight  Height (cm): 185.4 (24 Aug 2022 21:07)  Weight (kg): 72.5 (22 Aug 2022 15:35)  BMI (kg/m2): 21.1 (24 Aug 2022 21:07)  BSA (m2): 1.96 (24 Aug 2022 21:07)  Height (cm): 185.4 (08-24-22 @ 21:07)  CAPILLARY BLOOD GLUCOSE  262 (25 Aug 2022 08:15)      POCT Blood Glucose.: 229 mg/dL (25 Aug 2022 11:24)  POCT Blood Glucose.: 209 mg/dL (24 Aug 2022 22:47)  POCT Blood Glucose.: 224 mg/dL (24 Aug 2022 20:56)  POCT Blood Glucose.: 192 mg/dL (24 Aug 2022 17:40)  POCT Blood Glucose.: 182 mg/dL (24 Aug 2022 15:52)    I&O's Summary    24 Aug 2022 07:01  -  25 Aug 2022 07:00  --------------------------------------------------------  IN: 0 mL / OUT: 625 mL / NET: -625 mL          RADIOLOGY & ADDITIONAL TESTS:  Imaging Personally Reviewed:  [x] YES  [ ] NO      MEDICATIONS  (STANDING):  dextrose 5%. 1000 milliLiter(s) (50 mL/Hr) IV Continuous <Continuous>  dextrose 5%. 1000 milliLiter(s) (100 mL/Hr) IV Continuous <Continuous>  dextrose 50% Injectable 25 Gram(s) IV Push once  dextrose Oral Gel 15 Gram(s) Oral once  enoxaparin Injectable 40 milliGRAM(s) SubCutaneous every 24 hours  glucagon  Injectable 1 milliGRAM(s) IntraMuscular once  insulin glargine Injectable (LANTUS) 30 Unit(s) SubCutaneous at bedtime  insulin lispro (ADMELOG) corrective regimen sliding scale   SubCutaneous three times a day before meals  insulin lispro Injectable (ADMELOG) 10 Unit(s) SubCutaneous three times a day before meals  sodium chloride 0.9%. 1000 milliLiter(s) (100 mL/Hr) IV Continuous <Continuous>    MEDICATIONS  (PRN):  famotidine    Tablet 40 milliGRAM(s) Oral two times a day PRN Reflux  metoclopramide 5 milliGRAM(s) Oral every 6 hours PRN nausea vomiting  ondansetron Injectable 4 milliGRAM(s) IV Push every 8 hours PRN Nausea and/or Vomiting    
LUNA DOE  26y, Male  Allergy: No Known Allergies      CHIEF COMPLAINT: vomiting (23 Aug 2022 08:23)      HPI:  25 yo male IDDM visiting from the [facility] - poor historian at moment due to nausea and vomiting - did not feel like speaking but tells me that vomiting and nasusea for 2 days - found to be in DKA in ER and admitted to ICU. states compliance with insulin, no recent illness or undercooked food exposure , + chills - no fever, denies etoh, drugs, no diarrhea, cough, dysuria normally takes basal insulin 30 units and 13 units split between prandial meals - doesnt recall names of insulin at moment.  (22 Aug 2022 14:15)    HPI:    FAMILY HISTORY:  No pertinent family history in first degree relatives      PAST MEDICAL & SURGICAL HISTORY:  Insulin dependent type 1 diabetes mellitus      No significant past surgical history          SOCIAL HISTORY  Social History:  denies eoth or smoking (22 Aug 2022 14:15)        ROS  General: Denies fevers, chills, nightsweats, weight loss  HEENT: Denies headache, rhinorrhea, sore throat, eye pain  CV: Denies CP, palpitations  PULM: Denies SOB, cough  GI: mild nausea, abd pain improve d  : Denies dysuria, hematuria  MSK: Denies arthralgias  SKIN: Denies rash   NEURO: Denies paresthesias, weakness  PSYCH: Denies depression  ICU Vital Signs Last 24 Hrs  T(C): 37.3 (24 Aug 2022 21:07), Max: 37.3 (24 Aug 2022 21:07)  T(F): 99.2 (24 Aug 2022 21:07), Max: 99.2 (24 Aug 2022 21:07)  HR: 63 (24 Aug 2022 21:07) (50 - 66)  BP: 132/82 (24 Aug 2022 21:07) (119/78 - 149/65)  BP(mean): 97 (24 Aug 2022 17:00) (88 - 109)  ABP: --  ABP(mean): --  RR: 20 (24 Aug 2022 21:07) (14 - 26)  SpO2: 99% (24 Aug 2022 19:00) (98% - 100%)    O2 Parameters below as of 24 Aug 2022 21:07  Patient On (Oxygen Delivery Method): room air        CAPILLARY BLOOD GLUCOSE      POCT Blood Glucose.: 209 mg/dL (24 Aug 2022 22:47)  POCT Blood Glucose.: 224 mg/dL (24 Aug 2022 20:56)  POCT Blood Glucose.: 192 mg/dL (24 Aug 2022 17:40)  POCT Blood Glucose.: 182 mg/dL (24 Aug 2022 15:52)  POCT Blood Glucose.: 192 mg/dL (24 Aug 2022 11:01)  POCT Blood Glucose.: 217 mg/dL (24 Aug 2022 08:10)  POCT Blood Glucose.: 154 mg/dL (23 Aug 2022 23:46)          PHYSICAL EXAM:  Gen: NAD, resting in bed  HEENT: Normocephalic, atraumatic  Neck: supple, no lymphadenopathy  CV: Regular rate & regular rhythm  Lungs: decreased BS at bases, no fremitus  Abdomen: Soft, BS present  Ext: Warm, well perfused  Neuro: non focal, awake  Skin: no rash, no erythema  Psych: no SI, HI, Hallucination     TESTS & MEASUREMENTS:                        15.8   18.07 )-----------( 240      ( 23 Aug 2022 05:38 )             46.5     08-23    137  |  103  |  14  ----------------------------<  169<H>  4.3   |  23  |  0.9    Ca    9.8      23 Aug 2022 05:38  Phos  2.9     08-23  Mg     1.9     08-23    TPro  7.5  /  Alb  4.7  /  TBili  0.7  /  DBili  x   /  AST  13  /  ALT  14  /  AlkPhos  65  08-23      LIVER FUNCTIONS - ( 23 Aug 2022 05:38 )  Alb: 4.7 g/dL / Pro: 7.5 g/dL / ALK PHOS: 65 U/L / ALT: 14 U/L / AST: 13 U/L / GGT: x                 Blood Gas Venous - Lactate: 4.90 mmol/L (08-22-22 @ 11:58)    QRS axis to [] ° and NSR at a rate of [] BPM. There was no atrial enlargement. There was no ventricular hypertrophy. There were no ST-T changes and all intervals were normal.      INFECTIOUS DISEASES TESTING      RADIOLOGY & ADDITIONAL TESTS:  I have personally reviewed the last Chest xray  CXR  Xray Chest 1 View- PORTABLE-Urgent:   ACC: [id number] EXAM:  XR CHEST PORTABLE URGENT 1V                          PROCEDURE DATE:  08/22/2022          INTERPRETATION:  Clinical History / Reason for exam: DKA    Comparison : Chest radiograph None.    Technique/Positioning: AP chest.    Findings:    Support devices: None.    Cardiac/mediastinum/hilum: Unremarkable.    Lung parenchyma/Pleura: Within normal limits    Skeleton/soft tissues: Scoliosis of the spine versus related to rotation    Impression:    No radiographic evidence of acute cardiopulmonary disease.        --- End of Report ---            YULIA BERRY MD; Attending Radiologist  This document has been electronically signed. Aug 22 2022 11:20AM (08-22-22 @ 10:51)      CT      CARDIOLOGY TESTING  12 Lead ECG:   Ventricular Rate 58 BPM    Atrial Rate 58 BPM    P-R Interval 152 ms    QRS Duration 84 ms    Q-T Interval 398 ms    QTC Calculation(Bazett) 390 ms    P Axis 20 degrees    R Axis 52 degrees    T Axis 53 degrees    Diagnosis Line Sinus bradycardia  Otherwise normal ECG    Confirmed by SENTHIL WESTON MD [id number]) on 8/23/2022 9:39:00 AM (08-23-22 @ 08:14)  12 Lead ECG:   Ventricular Rate 70 BPM    Atrial Rate 70 BPM    P-R Interval 122 ms    QRS Duration 88 ms    Q-T Interval 382 ms    QTC Calculation(Bazett) 412 ms    P Axis -65 degrees    R Axis 85 degrees    T Axis 73 degrees    Diagnosis Line Unusual P axis andshort WA, probable junctional rhythm  ST elevation, consider early repolarization  Abnormal ECG    Confirmed by SENTHIL WESTON MD [id number]) on 8/22/2022 5:06:10 PM (08-22-22 @ 14:43)      MEDICATIONS  dextrose 5%. 1000  dextrose 5%. 1000  dextrose 50% Injectable 25  dextrose Oral Gel 15  enoxaparin Injectable 40  glucagon  Injectable 1  insulin glargine Injectable (LANTUS) 30  insulin lispro (ADMELOG) corrective regimen sliding scale   insulin lispro Injectable (ADMELOG) 10      ANTIBIOTICS:      All available historical data has been reviewed    ASSESSMENT  26y M admitted with DKA, TYPE 1        PROBLEMS  
  LUNA DOE  26y  Male      Subjective:     continues to have ongoing nausea     REVIEW OF SYSTEMS:  All 12 ROS negative except ones mentioned in HPI     T(C): 37 (08-25-22 @ 13:47), Max: 37.4 (08-25-22 @ 05:11)  HR: 59 (08-25-22 @ 13:47) (56 - 63)  BP: 108/62 (08-25-22 @ 13:47) (108/62 - 143/71)  RR: 20 (08-25-22 @ 13:47) (18 - 20)  SpO2: 99% (08-24-22 @ 19:00) (98% - 99%)    PHYSICAL EXAM:  GENERAL: NAD  HEAD:  Atraumatic, Normocephalic  ENMT: Moist mucous membranes  NECK: Supple, No JVD  CHEST/LUNG: Clear to percussion bilaterally  HEART: Regular rate and rhythm; No murmurs, rubs, or gallops  ABDOMEN: Soft, Nontender, Nondistended; Bowel sounds present  EXTREMITIES:   No clubbing, cyanosis, or edema  NERVOUS SYSTEM:  Alert & Oriented X3      Consultant(s) Notes Reviewed:  [x ] YES  [ ] NO  Care Discussed with Consultants/Other Providers [ x] YES  [ ] NO                            15.2   8.23  )-----------( 212      ( 26 Aug 2022 08:22 )             43.8   08-26    132<L>  |  95<L>  |  11  ----------------------------<  226<H>  3.8   |  25  |  0.8    Ca    9.1      26 Aug 2022 08:22  Mg     1.8     08-25    TPro  6.6  /  Alb  4.1  /  TBili  1.0  /  DBili  x   /  AST  11  /  ALT  9   /  AlkPhos  60  08-26              Drug Dosing Weight  Height (cm): 185.4 (24 Aug 2022 21:07)  Weight (kg): 72.5 (22 Aug 2022 15:35)  BMI (kg/m2): 21.1 (24 Aug 2022 21:07)  BSA (m2): 1.96 (24 Aug 2022 21:07)  Height (cm): 185.4 (08-24-22 @ 21:07)  CAPILLARY BLOOD GLUCOSE  262 (25 Aug 2022 08:15)      POCT Blood Glucose.: 229 mg/dL (25 Aug 2022 11:24)  POCT Blood Glucose.: 209 mg/dL (24 Aug 2022 22:47)  POCT Blood Glucose.: 224 mg/dL (24 Aug 2022 20:56)  POCT Blood Glucose.: 192 mg/dL (24 Aug 2022 17:40)  POCT Blood Glucose.: 182 mg/dL (24 Aug 2022 15:52)    I&O's Summary    24 Aug 2022 07:01  -  25 Aug 2022 07:00  --------------------------------------------------------  IN: 0 mL / OUT: 625 mL / NET: -625 mL          RADIOLOGY & ADDITIONAL TESTS:  Imaging Personally Reviewed:  [x] YES  [ ] NO      MEDICATIONS  (STANDING):  dextrose 5%. 1000 milliLiter(s) (50 mL/Hr) IV Continuous <Continuous>  dextrose 5%. 1000 milliLiter(s) (100 mL/Hr) IV Continuous <Continuous>  dextrose 50% Injectable 25 Gram(s) IV Push once  dextrose Oral Gel 15 Gram(s) Oral once  enoxaparin Injectable 40 milliGRAM(s) SubCutaneous every 24 hours  glucagon  Injectable 1 milliGRAM(s) IntraMuscular once  insulin glargine Injectable (LANTUS) 30 Unit(s) SubCutaneous at bedtime  insulin lispro (ADMELOG) corrective regimen sliding scale   SubCutaneous three times a day before meals  insulin lispro Injectable (ADMELOG) 10 Unit(s) SubCutaneous three times a day before meals  sodium chloride 0.9%. 1000 milliLiter(s) (100 mL/Hr) IV Continuous <Continuous>    MEDICATIONS  (PRN):  famotidine    Tablet 40 milliGRAM(s) Oral two times a day PRN Reflux  metoclopramide 5 milliGRAM(s) Oral every 6 hours PRN nausea vomiting  ondansetron Injectable 4 milliGRAM(s) IV Push every 8 hours PRN Nausea and/or Vomiting    
NADERLUNA SERNA  26y, Male  Allergy: No Known Allergies      CHIEF COMPLAINT: vomiting (23 Aug 2022 08:23)      HPI:  27 yo male IDDM visiting from the [facility] - poor historian at moment due to nausea and vomiting - did not feel like speaking but tells me that vomiting and nasusea for 2 days - found to be in DKA in ER and admitted to ICU. states compliance with insulin, no recent illness or undercooked food exposure , + chills - no fever, denies etoh, drugs, no diarrhea, cough, dysuria normally takes basal insulin 30 units and 13 units split between prandial meals - doesnt recall names of insulin at moment.  (22 Aug 2022 14:15)    HPI:    FAMILY HISTORY:  No pertinent family history in first degree relatives      PAST MEDICAL & SURGICAL HISTORY:  Insulin dependent type 1 diabetes mellitus      No significant past surgical history          SOCIAL HISTORY  Social History:  denies eoth or smoking (22 Aug 2022 14:15)        ROS  General: Denies fevers, chills, nightsweats, weight loss  HEENT: Denies headache, rhinorrhea, sore throat, eye pain  CV: Denies CP, palpitations  PULM: Denies SOB, cough  GI: mild nausea, abd pain improve d  : Denies dysuria, hematuria  MSK: Denies arthralgias  SKIN: Denies rash   NEURO: Denies paresthesias, weakness  PSYCH: Denies depression    VITALS:  T(F): 98.4, Max: 99.4 (08-22-22 @ 23:05)  HR: 65  BP: 127/84  RR: 12Vital Signs Last 24 Hrs  T(C): 36.9 (23 Aug 2022 11:01), Max: 37.4 (22 Aug 2022 23:05)  T(F): 98.4 (23 Aug 2022 11:01), Max: 99.4 (22 Aug 2022 23:05)  HR: 65 (23 Aug 2022 13:20) (58 - 72)  BP: 127/84 (23 Aug 2022 13:20) (104/66 - 140/70)  BP(mean): 101 (23 Aug 2022 13:20) (81 - 106)  RR: 12 (23 Aug 2022 13:20) (8 - 31)  SpO2: 100% (23 Aug 2022 13:20) (97% - 100%)    Parameters below as of 23 Aug 2022 13:20  Patient On (Oxygen Delivery Method): room air        PHYSICAL EXAM:  Gen: NAD, resting in bed  HEENT: Normocephalic, atraumatic  Neck: supple, no lymphadenopathy  CV: Regular rate & regular rhythm  Lungs: decreased BS at bases, no fremitus  Abdomen: Soft, BS present  Ext: Warm, well perfused  Neuro: non focal, awake  Skin: no rash, no erythema  Psych: no SI, HI, Hallucination     TESTS & MEASUREMENTS:                        15.8   18.07 )-----------( 240      ( 23 Aug 2022 05:38 )             46.5     08-23    137  |  103  |  14  ----------------------------<  169<H>  4.3   |  23  |  0.9    Ca    9.8      23 Aug 2022 05:38  Phos  2.9     08-23  Mg     1.9     08-23    TPro  7.5  /  Alb  4.7  /  TBili  0.7  /  DBili  x   /  AST  13  /  ALT  14  /  AlkPhos  65  08-23      LIVER FUNCTIONS - ( 23 Aug 2022 05:38 )  Alb: 4.7 g/dL / Pro: 7.5 g/dL / ALK PHOS: 65 U/L / ALT: 14 U/L / AST: 13 U/L / GGT: x                 Blood Gas Venous - Lactate: 4.90 mmol/L (08-22-22 @ 11:58)    QRS axis to [] ° and NSR at a rate of [] BPM. There was no atrial enlargement. There was no ventricular hypertrophy. There were no ST-T changes and all intervals were normal.      INFECTIOUS DISEASES TESTING      RADIOLOGY & ADDITIONAL TESTS:  I have personally reviewed the last Chest xray  CXR  Xray Chest 1 View- PORTABLE-Urgent:   ACC: [id number] EXAM:  XR CHEST PORTABLE URGENT 1V                          PROCEDURE DATE:  08/22/2022          INTERPRETATION:  Clinical History / Reason for exam: DKA    Comparison : Chest radiograph None.    Technique/Positioning: AP chest.    Findings:    Support devices: None.    Cardiac/mediastinum/hilum: Unremarkable.    Lung parenchyma/Pleura: Within normal limits    Skeleton/soft tissues: Scoliosis of the spine versus related to rotation    Impression:    No radiographic evidence of acute cardiopulmonary disease.        --- End of Report ---            YULIA BERRY MD; Attending Radiologist  This document has been electronically signed. Aug 22 2022 11:20AM (08-22-22 @ 10:51)      CT      CARDIOLOGY TESTING  12 Lead ECG:   Ventricular Rate 58 BPM    Atrial Rate 58 BPM    P-R Interval 152 ms    QRS Duration 84 ms    Q-T Interval 398 ms    QTC Calculation(Bazett) 390 ms    P Axis 20 degrees    R Axis 52 degrees    T Axis 53 degrees    Diagnosis Line Sinus bradycardia  Otherwise normal ECG    Confirmed by SENTHIL WESTON MD ([id number]) on 8/23/2022 9:39:00 AM (08-23-22 @ 08:14)  12 Lead ECG:   Ventricular Rate 70 BPM    Atrial Rate 70 BPM    P-R Interval 122 ms    QRS Duration 88 ms    Q-T Interval 382 ms    QTC Calculation(Bazett) 412 ms    P Axis -65 degrees    R Axis 85 degrees    T Axis 73 degrees    Diagnosis Line Unusual P axis andshort WA, probable junctional rhythm  ST elevation, consider early repolarization  Abnormal ECG    Confirmed by SENTHIL WESTON MD ([id number]) on 8/22/2022 5:06:10 PM (08-22-22 @ 14:43)      MEDICATIONS  dextrose 5%. 1000  dextrose 5%. 1000  dextrose 50% Injectable 25  dextrose Oral Gel 15  enoxaparin Injectable 40  glucagon  Injectable 1  insulin glargine Injectable (LANTUS) 30  insulin lispro (ADMELOG) corrective regimen sliding scale   insulin lispro Injectable (ADMELOG) 10      ANTIBIOTICS:      All available historical data has been reviewed    ASSESSMENT  26y M admitted with DKA, TYPE 1        PROBLEMS  
S: patient FS rveiewed hypoglycemia noted, patient not tolerating diet , ? gastroparesis   O:Vital Signs Last 24 Hrs  T(C): 37 (27 Aug 2022 04:32), Max: 37.2 (26 Aug 2022 20:40)  T(F): 98.6 (27 Aug 2022 04:32), Max: 98.9 (26 Aug 2022 20:40)  HR: 62 (27 Aug 2022 04:32) (57 - 62)  BP: 127/60 (27 Aug 2022 04:32) (127/60 - 156/105)  BP(mean): --  RR: 18 (27 Aug 2022 04:32) (16 - 18)  SpO2: --        08-26    132<L>  |  95<L>  |  11  ----------------------------<  226<H>  3.8   |  25  |  0.8    Ca    9.1      26 Aug 2022 08:22    TPro  6.6  /  Alb  4.1  /  TBili  1.0  /  DBili  x   /  AST  11  /  ALT  9   /  AlkPhos  60  08-26        CAPILLARY BLOOD GLUCOSE      POCT Blood Glucose.: 112 mg/dL (27 Aug 2022 11:44)  POCT Blood Glucose.: 71 mg/dL (27 Aug 2022 08:13)  POCT Blood Glucose.: 40 mg/dL (27 Aug 2022 07:33)  POCT Blood Glucose.: 141 mg/dL (26 Aug 2022 20:27)  POCT Blood Glucose.: 73 mg/dL (26 Aug 2022 17:38)  POCT Blood Glucose.: 44 mg/dL (26 Aug 2022 16:42)        MEDICATIONS  (STANDING):  dextrose 5%. 1000 milliLiter(s) (50 mL/Hr) IV Continuous <Continuous>  dextrose 5%. 1000 milliLiter(s) (100 mL/Hr) IV Continuous <Continuous>  dextrose 50% Injectable 25 Gram(s) IV Push once  dextrose Oral Gel 15 Gram(s) Oral once  enoxaparin Injectable 40 milliGRAM(s) SubCutaneous every 24 hours  glucagon  Injectable 1 milliGRAM(s) IntraMuscular once  insulin glargine Injectable (LANTUS) 15 Unit(s) SubCutaneous at bedtime  insulin lispro (ADMELOG) corrective regimen sliding scale   SubCutaneous three times a day before meals  sodium chloride 0.9%. 1000 milliLiter(s) (100 mL/Hr) IV Continuous <Continuous>    MEDICATIONS  (PRN):  famotidine    Tablet 40 milliGRAM(s) Oral two times a day PRN Reflux  metoclopramide 5 milliGRAM(s) Oral every 6 hours PRN nausea vomiting  ondansetron Injectable 4 milliGRAM(s) IV Push every 8 hours PRN Nausea and/or Vomiting

## 2022-08-27 NOTE — PROGRESS NOTE ADULT - ASSESSMENT
# DKA - likely due to noncompliance with insulin - did not bring insulin on vacation  DKa resolved  sitched to sc insulin - fs acceptable   normally takes humalog 13 meal, 30 basaliglar qhs  monitor fs closely  endocrine recs appreciated  - pt complains of hypersalivation  sp glycopyrrolate and unable to tolerate any food intake - need to ensure adequate oral intake before dc  CM to ensure he had meds  - pt states he has medicaid, shauna on chart - follow up with cm to clarify  pt travelling [facility] - doesnt live here.   RUQ sono negative   lipase negative  improving   stable to downgrade to floor  prepare for dc home tomorrow if stable and able to tolerate diet   
# DKA - likely due to noncompliance with insulin - pt admits today  DKa resolved  sitched to sc insulin  normally takes humalog 13 meal, 30 basaliglar qhs  monitor fs closely  endocrine recs pending  CM to ensure he had meds   pt travelling [place] - doesnt live here.   RUQ sono negative   lipase negative  improving   stable to downgrade to floor  prepare for dc home tomorrow if stable  
#type 1 DM , DKA , secondary to missing insulin , now resolved, patient planned for discharge but persistent GI symptoms, and now with hypoglycemia   - agree with lowering Lantus to 15 units , ( if BG low , lantus still need to be given but can lower dose further ) and can give d5 at 30 cc if needed  and patient not tolerating diet  to avoid starvation ketosis   - agree with d/c admelog to now , as patient not eating   - continue ISS 1:50 > 150 for correction 
27 yo M w/ hx fo DMII admitted for nausea/vomiting and AG ketoacidosis 2/2 DKA   patient travelling from [place]     # DKA - likely due to noncompliance with insulin  - resolved   - c/w home dose of  humalog 13 meal, 30 basaliglar qhs  -monitor fs closely  endocrine recs appreciated    #nausea and vomiting 2/2 DKA and possible concomitant gastroparesis   - c/w Zofran add reglan   - c/w PPI   - RUQ sono negative   -lipase negative  - will get GI eval     # dvt ppx: enoxaparin subq 
25 yo M w/ hx fo DMII admitted for nausea/vomiting and AG ketoacidosis 2/2 DKA   patient travelling from [place]     # DKA - likely due to noncompliance with insulin  - resolved   - c/w home dose of  humalog 13 meal, 30 basaliglar qhs  -monitor fs closely  endocrine recs appreciated    #nausea and vomiting 2/2 DKA and possible concomitant gastroparesis   - c/w Zofran add reglan   - c/w PPI   - RUQ sono negative   -lipase negative    # dvt ppx: enoxaparin subq 
25 yo M w/ hx fo DMII admitted for nausea/vomiting and AG ketoacidosis 2/2 DKA   patient travelling from [place]     # DKA - likely due to noncompliance with insulin  - resolved   - currently on humalog 10 meal, 30 basaliglar qhs. Continues to have episodes of hypoglycemia so humalog 10 pre meals discontinued and basal glargine switched to 15 U from 30 U. consulted endocrinology for further recs regarding insulin   -monitor fs closely      #nausea and vomiting 2/2 DKA and possible concomitant gastroparesis resolving   - c/w Zofran add reglan   - c/w PPI   - RUQ sono negative   -lipase negative      # dvt ppx: enoxaparin subq 
Impression :  DKA     Plan:  AG closed   on lantus SQ   endo follow up    follow FS   BMP after noon follow AG   dvt prophylaxis     downgrade to floor or possible d/c follow hospitalist

## 2022-08-28 VITALS
DIASTOLIC BLOOD PRESSURE: 73 MMHG | HEART RATE: 59 BPM | SYSTOLIC BLOOD PRESSURE: 121 MMHG | RESPIRATION RATE: 18 BRPM | TEMPERATURE: 97 F

## 2022-08-28 LAB
ANION GAP SERPL CALC-SCNC: 9 MMOL/L — SIGNIFICANT CHANGE UP (ref 7–14)
BUN SERPL-MCNC: 5 MG/DL — LOW (ref 10–20)
CALCIUM SERPL-MCNC: 8.6 MG/DL — SIGNIFICANT CHANGE UP (ref 8.5–10.1)
CHLORIDE SERPL-SCNC: 103 MMOL/L — SIGNIFICANT CHANGE UP (ref 98–110)
CO2 SERPL-SCNC: 28 MMOL/L — SIGNIFICANT CHANGE UP (ref 17–32)
CREAT SERPL-MCNC: 0.8 MG/DL — SIGNIFICANT CHANGE UP (ref 0.7–1.5)
EGFR: 124 ML/MIN/1.73M2 — SIGNIFICANT CHANGE UP
GLUCOSE BLDC GLUCOMTR-MCNC: 155 MG/DL — HIGH (ref 70–99)
GLUCOSE BLDC GLUCOMTR-MCNC: 47 MG/DL — CRITICAL LOW (ref 70–99)
GLUCOSE BLDC GLUCOMTR-MCNC: 76 MG/DL — SIGNIFICANT CHANGE UP (ref 70–99)
GLUCOSE SERPL-MCNC: 52 MG/DL — CRITICAL LOW (ref 70–99)
HCT VFR BLD CALC: 39.6 % — LOW (ref 42–52)
HGB BLD-MCNC: 13.6 G/DL — LOW (ref 14–18)
MAGNESIUM SERPL-MCNC: 1.7 MG/DL — LOW (ref 1.8–2.4)
MCHC RBC-ENTMCNC: 28.3 PG — SIGNIFICANT CHANGE UP (ref 27–31)
MCHC RBC-ENTMCNC: 34.3 G/DL — SIGNIFICANT CHANGE UP (ref 32–37)
MCV RBC AUTO: 82.3 FL — SIGNIFICANT CHANGE UP (ref 80–94)
NRBC # BLD: 0 /100 WBCS — SIGNIFICANT CHANGE UP (ref 0–0)
PLATELET # BLD AUTO: 186 K/UL — SIGNIFICANT CHANGE UP (ref 130–400)
POTASSIUM SERPL-MCNC: 3.1 MMOL/L — LOW (ref 3.5–5)
POTASSIUM SERPL-SCNC: 3.1 MMOL/L — LOW (ref 3.5–5)
RBC # BLD: 4.81 M/UL — SIGNIFICANT CHANGE UP (ref 4.7–6.1)
RBC # FLD: 11.9 % — SIGNIFICANT CHANGE UP (ref 11.5–14.5)
SODIUM SERPL-SCNC: 140 MMOL/L — SIGNIFICANT CHANGE UP (ref 135–146)
WBC # BLD: 4.34 K/UL — LOW (ref 4.8–10.8)
WBC # FLD AUTO: 4.34 K/UL — LOW (ref 4.8–10.8)

## 2022-08-28 PROCEDURE — 99239 HOSP IP/OBS DSCHRG MGMT >30: CPT

## 2022-08-28 PROCEDURE — 99223 1ST HOSP IP/OBS HIGH 75: CPT

## 2022-08-28 RX ORDER — INSULIN GLARGINE 100 [IU]/ML
5 INJECTION, SOLUTION SUBCUTANEOUS
Qty: 0 | Refills: 0 | DISCHARGE
Start: 2022-08-28

## 2022-08-28 RX ORDER — INSULIN GLARGINE 100 [IU]/ML
5 INJECTION, SOLUTION SUBCUTANEOUS AT BEDTIME
Refills: 0 | Status: DISCONTINUED | OUTPATIENT
Start: 2022-08-28 | End: 2022-08-28

## 2022-08-28 RX ORDER — SODIUM CHLORIDE 9 MG/ML
1000 INJECTION, SOLUTION INTRAVENOUS
Refills: 0 | Status: DISCONTINUED | OUTPATIENT
Start: 2022-08-28 | End: 2022-08-28

## 2022-08-28 RX ADMIN — SODIUM CHLORIDE 100 MILLILITER(S): 9 INJECTION INTRAMUSCULAR; INTRAVENOUS; SUBCUTANEOUS at 06:51

## 2022-08-28 NOTE — CONSULT NOTE ADULT - ASSESSMENT
Impression :  DKA     Plan:  AG closed   on lantus SQ   endo consult   follow FS   BMP after noon follow AG   dvt prophylaxis   d/c iv fluid   downgrade to floor   
#type 1 DM , DKA likely secondary to missing insulin   - A1c 11.3% at home reports taking Basaglar 30 units and Humalog 10-15 units TIDAC , report compliance but moraima for vacation from [place] and did not bring his insulin .   - DKA resolved and patient was transitioned using Lantus 30 units, he is still unable to tolerate diet as he feels acid reflux  - Continue Lantus 30 units   - admelog 10 units TIDAC , and continue ISS 2: 50 > 150 for now , BG high now as he did not take short acting this am ,   - if unable to tolerate diet consider rechecking BMP and iv fluid   - he will follow up with his endocrinologist at [place] 
25 yo M  IDDM  Nausea and vomiting (resolved with resolution of DKA)    Rec:  glucose control  May consider endoscopy on OP basis

## 2022-08-28 NOTE — CHART NOTE - NSCHARTNOTEFT_GEN_A_CORE
Patient wants to sign out AMA. Risks explained In detail including death for recurrent hypoglycemia. Explained how adjustments to his medications are being made with endocrinology. Patient states he knows his body and will use sliding scale coverage and be compliant w/ his  meds.   patient going back to [place]

## 2022-08-28 NOTE — CONSULT NOTE ADULT - SUBJECTIVE AND OBJECTIVE BOX
Gastroenterology/Hepatology Consultation    For questions and inquiries please page [phone].  For urgent matters or after 5pm and on weekends please page the fellow on call through the GI paging system.    26y Male with   Patient is a 26y old  Male who presents with a chief complaint of vomiting (27 Aug 2022 12:58      GI Hx: 27 yo M IDDM, admitted for DKA. GI was consulted for intractable vomiting    Previous colonoscopy(ies): None on file  Previous EGD(s): None on file    No pertinent family history in first degree relatives        Review of system  General:  (-) weight loss, (-) fevers  Eyes:  (-) visual changes  CV:  (-) chest pain  Resp: (-) SOB, (-) wheezing  GI: (-) abdominal pain,  (+) nausea, (+) vomiting, (-) dysphagia, (-) diarrhea, (-) constipation, (-) rectal bleeding, (-) melena, (-) hematemesis.  Neuro: (-) confusion, (-) weakness  Psych:  (-) Hallucinations  Heme:  (-) easy bruisability    Past medical/surgical Hx:  PAST MEDICAL & SURGICAL HISTORY:  Insulin dependent type 1 diabetes mellitus      No significant past surgical history        Home Medications:      Allergies: No Known Allergies      Current Medications:   dextrose 5% + lactated ringers. 1000 milliLiter(s) IV Continuous <Continuous>  dextrose 5%. 1000 milliLiter(s) IV Continuous <Continuous>  dextrose 5%. 1000 milliLiter(s) IV Continuous <Continuous>  dextrose 50% Injectable 25 Gram(s) IV Push once  dextrose Oral Gel 15 Gram(s) Oral once  enoxaparin Injectable 40 milliGRAM(s) SubCutaneous every 24 hours  famotidine    Tablet 40 milliGRAM(s) Oral two times a day PRN  glucagon  Injectable 1 milliGRAM(s) IntraMuscular once  insulin glargine Injectable (LANTUS) 5 Unit(s) SubCutaneous at bedtime  insulin lispro (ADMELOG) corrective regimen sliding scale   SubCutaneous three times a day before meals  metoclopramide 5 milliGRAM(s) Oral every 6 hours PRN  ondansetron Injectable 4 milliGRAM(s) IV Push every 8 hours PRN  sodium chloride 0.9%. 1000 milliLiter(s) IV Continuous <Continuous>      Physical exam:  T(C): 36 (08-28-22 @ 05:00), Max: 37.1 (08-27-22 @ 21:00)  HR: 59 (08-28-22 @ 05:00) (57 - 72)  BP: 121/73 (08-28-22 @ 05:00) (116/75 - 121/73)  RR: 18 (08-28-22 @ 05:00) (18 - 18)    GENERAL: NAD  HEAD:  Atraumatic, Normocephalic  EYES: Sclera:NL  NECK: Supple, no JVD or thyromegaly  CHEST/LUNG: Good bilateral air entry  HEART: normal S1, S2. Regular  ABDOMEN: (-) distended, (-) tender, (-) rebound, (+) BS, (-)HSM  EXTREMITIES: (-) edema  NEUROLOGY: (-) asterixis  SKIN: (-) jaundice  VINH: Deferred    Data:                        13.6   4.34  )-----------( 186      ( 28 Aug 2022 06:20 )             39.6     MCV 82.3 (08-28-22)    RDW 11.9 (08-28-22)    HGB trend:  13.6  08-28-22 @ 06:20  15.2  08-26-22 @ 08:22          Mg     1.7     08-28      Liver panel trend:  TBili 1.0   /   AST 11   /   ALT 9   /   AlkP 60   /   Tptn 6.6   /   Alb 4.1    /   DBili --      08-26  TBili 0.8   /   AST 13   /   ALT 9   /   AlkP 84   /   Tptn 6.5   /   Alb 4.0    /   DBili --      08-25  TBili 0.6   /   AST 13   /   ALT 12   /   AlkP 72   /   Tptn 6.8   /   Alb 4.3    /   DBili --      08-24  TBili 0.7   /   AST 13   /   ALT 14   /   AlkP 65   /   Tptn 7.5   /   Alb 4.7    /   DBili --      08-23  TBili 0.8   /   AST 17   /   ALT 18   /   AlkP 64   /   Tptn 8.1   /   Alb 5.1    /   DBili --      08-22  TBili 1.6   /   AST 21   /   ALT 21   /   AlkP 83   /   Tptn 8.5   /   Alb 5.4    /   DBili --      08-22             Gastroenterology/Hepatology Consultation    For questions and inquiries please page [phone].  For urgent matters or after 5pm and on weekends please page the fellow on call through the GI paging system.    26y Male with nausea and vomiting  Patient is a 26y old  Male who presents with a chief complaint of vomiting (27 Aug 2022 12:58      GI Hx: 25 yo M IDDM, admitted for DKA. GI was consulted for intractable vomiting    Previous colonoscopy(ies): None on file  Previous EGD(s): None on file    No pertinent family history in first degree relatives        Review of system  General:  (-) weight loss, (-) fevers  Eyes:  (-) visual changes  CV:  (-) chest pain  Resp: (-) SOB, (-) wheezing  GI: (-) abdominal pain,  (+) nausea, (+) vomiting, (-) dysphagia, (-) diarrhea, (-) constipation, (-) rectal bleeding, (-) melena, (-) hematemesis.  Neuro: (-) confusion, (-) weakness  Psych:  (-) Hallucinations  Heme:  (-) easy bruisability    Past medical/surgical Hx:  PAST MEDICAL & SURGICAL HISTORY:  Insulin dependent type 1 diabetes mellitus      No significant past surgical history        Home Medications:      Allergies: No Known Allergies      Current Medications:   dextrose 5% + lactated ringers. 1000 milliLiter(s) IV Continuous <Continuous>  dextrose 5%. 1000 milliLiter(s) IV Continuous <Continuous>  dextrose 5%. 1000 milliLiter(s) IV Continuous <Continuous>  dextrose 50% Injectable 25 Gram(s) IV Push once  dextrose Oral Gel 15 Gram(s) Oral once  enoxaparin Injectable 40 milliGRAM(s) SubCutaneous every 24 hours  famotidine    Tablet 40 milliGRAM(s) Oral two times a day PRN  glucagon  Injectable 1 milliGRAM(s) IntraMuscular once  insulin glargine Injectable (LANTUS) 5 Unit(s) SubCutaneous at bedtime  insulin lispro (ADMELOG) corrective regimen sliding scale   SubCutaneous three times a day before meals  metoclopramide 5 milliGRAM(s) Oral every 6 hours PRN  ondansetron Injectable 4 milliGRAM(s) IV Push every 8 hours PRN  sodium chloride 0.9%. 1000 milliLiter(s) IV Continuous <Continuous>      Physical exam:  T(C): 36 (08-28-22 @ 05:00), Max: 37.1 (08-27-22 @ 21:00)  HR: 59 (08-28-22 @ 05:00) (57 - 72)  BP: 121/73 (08-28-22 @ 05:00) (116/75 - 121/73)  RR: 18 (08-28-22 @ 05:00) (18 - 18)    GENERAL: NAD  HEAD:  Atraumatic, Normocephalic  EYES: Sclera:NL  NECK: Supple, no JVD or thyromegaly  CHEST/LUNG: Good bilateral air entry  HEART: normal S1, S2. Regular  ABDOMEN: (-) distended, (-) tender, (-) rebound, (+) BS, (-)HSM  EXTREMITIES: (-) edema  NEUROLOGY: (-) asterixis  SKIN: (-) jaundice  VINH: Deferred    Data:                        13.6   4.34  )-----------( 186      ( 28 Aug 2022 06:20 )             39.6     MCV 82.3 (08-28-22)    RDW 11.9 (08-28-22)    HGB trend:  13.6  08-28-22 @ 06:20  15.2  08-26-22 @ 08:22          Mg     1.7     08-28      Liver panel trend:  TBili 1.0   /   AST 11   /   ALT 9   /   AlkP 60   /   Tptn 6.6   /   Alb 4.1    /   DBili --      08-26  TBili 0.8   /   AST 13   /   ALT 9   /   AlkP 84   /   Tptn 6.5   /   Alb 4.0    /   DBili --      08-25  TBili 0.6   /   AST 13   /   ALT 12   /   AlkP 72   /   Tptn 6.8   /   Alb 4.3    /   DBili --      08-24  TBili 0.7   /   AST 13   /   ALT 14   /   AlkP 65   /   Tptn 7.5   /   Alb 4.7    /   DBili --      08-23  TBili 0.8   /   AST 17   /   ALT 18   /   AlkP 64   /   Tptn 8.1   /   Alb 5.1    /   DBili --      08-22  TBili 1.6   /   AST 21   /   ALT 21   /   AlkP 83   /   Tptn 8.5   /   Alb 5.4    /   DBili --      08-22

## 2022-08-28 NOTE — DISCHARGE NOTE NURSING/CASE MANAGEMENT/SOCIAL WORK - NSDCPEFALRISK_GEN_ALL_CORE
For information on Fall & Injury Prevention, visit: [email or web address] OR  [email or web address] OR  https://www.cdc.gov/steadi/patient.html

## 2022-08-28 NOTE — DISCHARGE NOTE NURSING/CASE MANAGEMENT/SOCIAL WORK - PATIENT PORTAL LINK FT
You can access the FollowMyHealth Patient Portal offered by [facility] by registering at the following website: [email or web address]. By joining [employer]’s FollowMyHealth portal, you will also be able to view your health information using other applications (apps) compatible with our system.

## 2022-08-31 DIAGNOSIS — Z79.4 LONG TERM (CURRENT) USE OF INSULIN: ICD-10-CM

## 2022-08-31 DIAGNOSIS — E10.10 TYPE 1 DIABETES MELLITUS WITH KETOACIDOSIS WITHOUT COMA: ICD-10-CM

## 2022-08-31 DIAGNOSIS — R11.10 VOMITING, UNSPECIFIED: ICD-10-CM

## 2022-08-31 DIAGNOSIS — Z53.29 PROCEDURE AND TREATMENT NOT CARRIED OUT BECAUSE OF PATIENT'S DECISION FOR OTHER REASONS: ICD-10-CM

## 2022-08-31 DIAGNOSIS — E10.649 TYPE 1 DIABETES MELLITUS WITH HYPOGLYCEMIA WITHOUT COMA: ICD-10-CM

## 2022-08-31 DIAGNOSIS — Z91.14 PATIENT'S OTHER NONCOMPLIANCE WITH MEDICATION REGIMEN: ICD-10-CM
